# Patient Record
Sex: FEMALE | Race: WHITE | URBAN - METROPOLITAN AREA
[De-identification: names, ages, dates, MRNs, and addresses within clinical notes are randomized per-mention and may not be internally consistent; named-entity substitution may affect disease eponyms.]

---

## 2017-02-21 ENCOUNTER — OP HISTORICAL/CONVERTED ENCOUNTER (OUTPATIENT)
Dept: OTHER | Age: 37
End: 2017-02-21

## 2017-06-09 ENCOUNTER — OFFICE VISIT (OUTPATIENT)
Dept: ENDOCRINOLOGY | Age: 37
End: 2017-06-09

## 2017-06-09 VITALS
DIASTOLIC BLOOD PRESSURE: 60 MMHG | HEART RATE: 101 BPM | RESPIRATION RATE: 16 BRPM | SYSTOLIC BLOOD PRESSURE: 132 MMHG | OXYGEN SATURATION: 97 % | HEIGHT: 68 IN | BODY MASS INDEX: 44.41 KG/M2 | WEIGHT: 293 LBS | TEMPERATURE: 97.6 F

## 2017-06-09 DIAGNOSIS — L68.0 HIRSUTISM: ICD-10-CM

## 2017-06-09 DIAGNOSIS — N91.5 OLIGOMENORRHEA: ICD-10-CM

## 2017-06-09 DIAGNOSIS — E28.2 PCOS (POLYCYSTIC OVARIAN SYNDROME): ICD-10-CM

## 2017-06-09 DIAGNOSIS — E66.01 MORBID OBESITY, UNSPECIFIED OBESITY TYPE (HCC): ICD-10-CM

## 2017-06-09 DIAGNOSIS — E28.2 PCOS (POLYCYSTIC OVARIAN SYNDROME): Primary | ICD-10-CM

## 2017-06-09 RX ORDER — METFORMIN HYDROCHLORIDE 500 MG/1
500 TABLET, EXTENDED RELEASE ORAL 2 TIMES DAILY WITH MEALS
Qty: 180 TAB | Refills: 3 | Status: SHIPPED | OUTPATIENT
Start: 2017-06-09 | End: 2017-07-13 | Stop reason: SDUPTHER

## 2017-06-09 RX ORDER — NORETHINDRONE ACETATE AND ETHINYL ESTRADIOL 1MG-20(21)
1 KIT ORAL DAILY
Qty: 90 TAB | Refills: 3 | Status: SHIPPED | OUTPATIENT
Start: 2017-06-09 | End: 2017-12-15 | Stop reason: ALTCHOICE

## 2017-06-09 RX ORDER — SPIRONOLACTONE 100 MG/1
100 TABLET, FILM COATED ORAL
Qty: 90 TAB | Refills: 3 | Status: SHIPPED | OUTPATIENT
Start: 2017-06-09 | End: 2018-05-25 | Stop reason: SDUPTHER

## 2017-06-09 NOTE — MR AVS SNAPSHOT
Visit Information Date & Time Provider Department Dept. Phone Encounter #  
 6/9/2017  1:45 PM Donnell Tao MD [facility] [phone] [id number] Follow-up Instructions Return in about 6 months (around 12/9/2017). Upcoming Health Maintenance Date Due DTaP/Tdap/Td series (1 - Tdap) 8/13/2000 PAP AKA CERVICAL CYTOLOGY 8/13/2000 INFLUENZA AGE 9 TO ADULT 8/1/2017 Allergies as of 6/9/2017  Review Complete On: 6/9/2017 By: Donnell Tao MD  
  
 Severity Noted Reaction Type Reactions Penicillins  03/13/2015    Other (comments) Current Immunizations  Never Reviewed No immunizations on file. Not reviewed this visit You Were Diagnosed With   
  
 Codes Comments PCOS (polycystic ovarian syndrome)    -  Primary ICD-10-CM: E28.2 ICD-9-CM: 256.4 Oligomenorrhea     ICD-10-CM: N91.5 ICD-9-CM: 626.1 Hirsutism     ICD-10-CM: L68.0 ICD-9-CM: 704.1 Vitals BP Pulse Temp Resp Height(growth percentile) Weight(growth percentile) 132/60 (BP 1 Location: Right arm, BP Patient Position: Sitting) (!) 101 97.6 °F (36.4 °C) (Oral) 16 5' 8\" (1.727 m) 304 lb 14.4 oz (138.3 kg) SpO2 BMI Smoking Status 97% 46.36 kg/m2 Never Smoker BMI and BSA Data Body Mass Index Body Surface Area  
 46.36 kg/m 2 2.58 m 2 Preferred Pharmacy Pharmacy Name Phone  [address] [phone] Your Updated Medication List  
  
   
This list is accurate as of: 6/9/17  2:50 PM.  Always use your most recent med list.  
  
  
  
  
 amitriptyline 25 mg tablet Commonly known as:  ELAVIL Take 25 mg by mouth nightly. EPIPEN 0.3 mg/0.3 mL injection Generic drug:  EPINEPHrine  
0.3 mg by IntraMUSCular route as needed. FIORINAL capsule Generic drug:  butalbital-aspirin-caffeine Take 1 Cap by mouth as needed for Pain. FLEXERIL 10 mg tablet Generic drug:  cyclobenzaprine Take 10 mg by mouth as needed for Muscle Spasm(s). FLONASE 50 mcg/actuation nasal spray Generic drug:  fluticasone 2 Sprays by Both Nostrils route as needed for Rhinitis. meclizine 25 mg tablet Commonly known as:  ANTIVERT Take 25 mg by mouth as needed. vertigo  
  
 norethindrone-ethinyl estradiol 1 mg-20 mcg (21)/75 mg (7) Tab Commonly known as:  Clearance Speedy FE 1/20 Take 1 Tab by mouth daily. 1 tab daily  
  
 scopolamine 1.5 mg (1 mg over 3 days) Pt3d Commonly known as:  TRANSDERM-SCOP  
1 Patch by TransDERmal route as needed. spironolactone 100 mg tablet Commonly known as:  ALDACTONE Take 1 Tab by mouth nightly. Follow-up Instructions Return in about 6 months (around 12/9/2017). Patient Instructions Instructions for salivary cortisol test 
 
1. Do not brush teeth before collecting specimen. 2. Do not eat or drink for 15 minutes prior to specimen collection. Night 1- Swab inside of cheeks between 11p-midnight for four (4) minutes, label with name, date of birth, collection date and collection time. Place swab in the freezer. 
  
Night 5- Swab inside of cheeks between 11p-midnight for four (4) minutes, label with name, date of birth, collection date and collection time. Place swab in the freezer.  
  
Swabs are to remain frozen until you can drop them off to [facility] with orders or our lab in the office. Introducing [facility]! Xiomara Yousif introduces [employer] patient portal. Now you can access parts of your medical record, email your doctor's office, and request medication refills online. 1. In your internet browser, go to [email or web address] 2. Click on the First Time User? Click Here link in the Sign In box. You will see the New Member Sign Up page. 3. Enter your [employer] Access Code exactly as it appears below.  You will not need to use this code after youve completed the sign-up process. If you do not sign up before the expiration date, you must request a new code. · [employer] Access Code: [email or web address] Expires: 9/7/2017  2:50 PM 
 
4. Enter the last four digits of your Social Security Number (xxxx) and Date of Birth (mm/dd/yyyy) as indicated and click Submit. You will be taken to the next sign-up page. 5. Create a [employer] ID. This will be your [employer] login ID and cannot be changed, so think of one that is secure and easy to remember. 6. Create a [employer] password. You can change your password at any time. 7. Enter your Password Reset Question and Answer. This can be used at a later time if you forget your password. 8. Enter your e-mail address. You will receive e-mail notification when new information is available in [address]. 9. Click Sign Up. You can now view and download portions of your medical record. 10. Click the Download Summary menu link to download a portable copy of your medical information. If you have questions, please visit the Frequently Asked Questions section of the [employer] website. Remember, [employer] is NOT to be used for urgent needs. For medical emergencies, dial 911. Now available from your iPhone and Android! Please provide this summary of care documentation to your next provider. Your primary care clinician is listed as Nik Schmidt. If you have any questions after today's visit, please call [phone].

## 2017-06-09 NOTE — PROGRESS NOTES
Jess Chao MD                Patient Information  Date:6/9/2017  Name : Ronald Rodarte 39 y.o.     YOB: 1980         Referred by: Self referred      Chief Complaint   Patient presents with    PCOS    Hirsutism       History of Present Illness: Ronald Rodarte is a 39 y.o. female here for follow up     Surgery was April 2017  Could not tolerate metformin, seen 2 years ago   Was on qsymia  She is on oral contraceptives     Hx CSF leak    More facial redness     She was tried on Metformin Immediate Release as well as Extended Release which she did not tolerate due to severe GI side effects. She was diagnosed with PCOS several years ago, managed by Dr. Maggie Vásquez, endocrinologist.    She reports oligomenorrhea and has three cycles per year. menarche at age 14 years. Wt Readings from Last 3 Encounters:   06/09/17 304 lb 14.4 oz (138.3 kg)   04/03/15 328 lb (148.8 kg)   03/13/15 328 lb 12.8 oz (149.1 kg)         Past Medical History:   Diagnosis Date    Hirsutism 3/13/2015    Migraine     Obesity 3/13/2015    PCOS (polycystic ovarian syndrome)      Past Surgical History:   Procedure Laterality Date    HX OTHER SURGICAL      DNC    HX OTHER SURGICAL  04/18/2017    brain surgery for ceffleak    HX TONSILLECTOMY       Current Outpatient Prescriptions   Medication Sig    cyclobenzaprine (FLEXERIL) 10 mg tablet Take 10 mg by mouth as needed for Muscle Spasm(s).  meclizine (ANTIVERT) 25 mg tablet Take 25 mg by mouth as needed. vertigo    scopolamine (TRANSDERM-SCOP) 1.5 mg 1 Patch by TransDERmal route as needed.  amitriptyline (ELAVIL) 25 mg tablet Take 25 mg by mouth nightly.  EPINEPHrine (EPIPEN) 0.3 mg/0.3 mL (1:1,000) injection 0.3 mg by IntraMUSCular route as needed.  fluticasone (FLONASE) 50 mcg/actuation nasal spray 2 Sprays by Both Nostrils route as needed for Rhinitis.     butalbital-aspirin-caffeine [facility]) capsule Take 1 Cap by mouth as needed for Pain.  metFORMIN ER (GLUCOPHAGE XR) 500 mg tablet Take 1 Tab by mouth two (2) times daily (with meals).  norethindrone-ethinyl estradiol (JUNEL FE 1/20) 1 mg-20 mcg (21)/75 mg (7) tab Take 1 Tab by mouth daily. 1 tab daily    spironolactone (ALDACTONE) 100 mg tablet Take 1 Tab by mouth nightly. No current facility-administered medications for this visit. Allergies   Allergen Reactions    Penicillins Other (comments)         Review of Systems:  - Constitutional Symptoms: no fevers, chills  - Eyes: no blurry vision no double vision  - Cardiovascular: no chest pain no palpitations  - Respiratory: no cough no shortness of breath  - Gastrointestinal: no dysphagia no abdominal pain  - Musculoskeletal: no joint pains + weakness  - Integumentary: no rashes  - Neurological: no numbness, tingling,  headaches  -     Physical Examination:  - Blood pressure 132/60, pulse (!) 101, temperature 97.6 °F (36.4 °C), temperature source Oral, resp. rate 16, height 5' 8\" (1.727 m), weight 304 lb 14.4 oz (138.3 kg), SpO2 97 %. Body mass index is 46.36 kg/(m^2). - General: pleasant, no distress, good eye contact, obese  - HEENT: no exopthalmos, no periorbital edema, no scleral/conjunctival injection, EOMI, Coarse thick hair chin and upper lip  - Neck: supple, no thyromegaly,   Facial erythema up  - Cardiovascular: regular, normal rate, normal S1 and S2  - Respiratory: clear to auscultation bilaterally  - Gastrointestinal: soft, nontender, nondistended,BS +  - Musculoskeletal:no acanthosis nigricans, no abnormal abdominal striae, no rashes, no edema  - Neurological: Alert and oriented, no tremor  - Psychiatric: normal mood and affect        Assessment/Plan:     1. PCOS (polycystic ovarian syndrome)    2. Oligomenorrhea    3. Hirsutism    4. Morbid obesity, unspecified obesity type        1. PCOS. Discussed  importance of hormonal regulation and also metabolic consequences. Willing to try Metformin again Discussed carbohydrate portion control, increase activity and weight loss. 2.  Hirsutism. Oral contraceptives in combination with Spironolactone. She was  also advised against pregnancy while she is on spironolactone because of teratogenic effects and hence needs to be taken with OCPS. Laser therapy and other epilation methods also helps. 3.  Oligomenorrhea. Metformin as discussed. 4.  Workup for Cushing's, congenital adrenal hyperplasia, non-classical - negative in the past    Salivary cortisol ,       Letha Humphries MD      Patient Instructions   Instructions for salivary cortisol test    1. Do not brush teeth before collecting specimen. 2. Do not eat or drink for 15 minutes prior to specimen collection. Night 1- Swab inside of cheeks between 11p-midnight for four (4) minutes, label with name, date of birth, collection date and collection time. Place swab in the freezer.     Night 5- Swab inside of cheeks between 11p-midnight for four (4) minutes, label with name, date of birth, collection date and collection time. Place swab in the freezer.      Swabs are to remain frozen until you can drop them off to [facility] with orders or our lab in the office. Follow-up Disposition:  Return in about 6 months (around 12/9/2017).

## 2017-06-09 NOTE — PROGRESS NOTES
Linnea Gallegos is a 39 y.o. female here for   Chief Complaint   Patient presents with    PCOS    Hirsutism       1. Have you been to the ER, urgent care clinic since your last visit? Hospitalized since your last visit? - [facility] April 2017 for brain surgery     2. Have you seen or consulted any other health care providers outside of the [address] [place] [address] since your last visit?   Include any pap smears or colon screening.- Dr. Olimpia Angulo, PCP    Wt Readings from Last 3 Encounters:   04/03/15 328 lb (148.8 kg)   03/13/15 328 lb 12.8 oz (149.1 kg)     Temp Readings from Last 3 Encounters:   04/03/15 97.2 °F (36.2 °C) (Oral)   03/13/15 95.9 °F (35.5 °C)     BP Readings from Last 3 Encounters:   04/03/15 137/68   03/13/15 136/75     Pulse Readings from Last 3 Encounters:   04/03/15 (!) 103   03/13/15 95

## 2017-06-21 DIAGNOSIS — E28.2 PCOS (POLYCYSTIC OVARIAN SYNDROME): ICD-10-CM

## 2017-06-21 DIAGNOSIS — N91.5 OLIGOMENORRHEA: ICD-10-CM

## 2017-06-21 DIAGNOSIS — L68.0 HIRSUTISM: ICD-10-CM

## 2017-06-22 LAB
ALBUMIN SERPL-MCNC: 4.3 G/DL (ref 3.5–5.5)
ALBUMIN/GLOB SERPL: 1.6 {RATIO} (ref 1.2–2.2)
ALP SERPL-CCNC: 51 IU/L (ref 39–117)
ALT SERPL-CCNC: 40 IU/L (ref 0–32)
AST SERPL-CCNC: 24 IU/L (ref 0–40)
BILIRUB SERPL-MCNC: 0.6 MG/DL (ref 0–1.2)
BUN SERPL-MCNC: 14 MG/DL (ref 6–20)
BUN/CREAT SERPL: 19 (ref 9–23)
CALCIUM SERPL-MCNC: 9.9 MG/DL (ref 8.7–10.2)
CHLORIDE SERPL-SCNC: 101 MMOL/L (ref 96–106)
CO2 SERPL-SCNC: 24 MMOL/L (ref 18–29)
CREAT SERPL-MCNC: 0.72 MG/DL (ref 0.57–1)
GLOBULIN SER CALC-MCNC: 2.7 G/DL (ref 1.5–4.5)
GLUCOSE SERPL-MCNC: 87 MG/DL (ref 65–99)
POTASSIUM SERPL-SCNC: 4.5 MMOL/L (ref 3.5–5.2)
PROT SERPL-MCNC: 7 G/DL (ref 6–8.5)
SODIUM SERPL-SCNC: 139 MMOL/L (ref 134–144)
TSH SERPL DL<=0.005 MIU/L-ACNC: 1.53 UIU/ML (ref 0.45–4.5)

## 2017-06-24 LAB
CORTIS BS SAL-MCNC: 0.02 UG/DL
CORTIS F 24H UR-MRATE: 11 UG/24 HR (ref 0–50)
CORTIS F UR-MCNC: 7 UG/L
CORTIS SP1 P CHAL SAL-MCNC: 0.01 UG/DL
CREAT 24H UR-MRATE: 1226 MG/24 HR (ref 800–1800)
CREAT UR-MCNC: 76.6 MG/DL

## 2017-06-26 ENCOUNTER — TELEPHONE (OUTPATIENT)
Dept: ENDOCRINOLOGY | Age: 37
End: 2017-06-26

## 2017-06-26 NOTE — TELEPHONE ENCOUNTER
----- Message from Tamiko Troncoso MD sent at 6/24/2017  9:22 AM EDT -----  Tests normal so far     No urine test result available

## 2017-06-26 NOTE — TELEPHONE ENCOUNTER
Per Dr. Jake Roldan, informed pt of result note, as noted above. Pt verbalized understanding with no further questions or concerns at this time.

## 2017-06-27 ENCOUNTER — TELEPHONE (OUTPATIENT)
Dept: ENDOCRINOLOGY | Age: 37
End: 2017-06-27

## 2017-06-27 NOTE — TELEPHONE ENCOUNTER
Per Dr. Rebeca Lopez, informed pt of result note, as noted above. Pt verbalized understanding with no further questions or concerns at this time.

## 2017-06-27 NOTE — TELEPHONE ENCOUNTER
Attempted to call. Unsuccessful. Left msg for Shama Horner to give us a call back at the office. A callback number was left.

## 2017-06-27 NOTE — TELEPHONE ENCOUNTER
----- Message from Rodney Richmond MD sent at 6/26/2017 10:25 PM EDT -----  No other hormonal abnormality found    She has PCOS and has to continue med which was RX to her

## 2017-07-12 DIAGNOSIS — N91.5 OLIGOMENORRHEA: ICD-10-CM

## 2017-07-12 DIAGNOSIS — E28.2 PCOS (POLYCYSTIC OVARIAN SYNDROME): ICD-10-CM

## 2017-07-12 DIAGNOSIS — L68.0 HIRSUTISM: ICD-10-CM

## 2017-07-12 NOTE — TELEPHONE ENCOUNTER
Pt called stating that [facility] sent a letter stating as of August 1st she will need a PA for her Metformin RX. She stated you can call [phone] to get this this taken care of.

## 2017-07-12 NOTE — TELEPHONE ENCOUNTER
Metformin XR is a generic medicine and not sure why [facility] wants PA    Any extended release Metformin is fine ,please call the number

## 2017-07-13 RX ORDER — METFORMIN HYDROCHLORIDE 500 MG/1
500 TABLET, EXTENDED RELEASE ORAL 2 TIMES DAILY WITH MEALS
Qty: 180 TAB | Refills: 3 | Status: SHIPPED | OUTPATIENT
Start: 2017-07-13 | End: 2018-09-21 | Stop reason: ALTCHOICE

## 2017-09-20 RX ORDER — NORETHINDRONE ACETATE AND ETHINYL ESTRADIOL AND FERROUS FUMARATE 1MG-20(21)
KIT ORAL
Qty: 84 TAB | Refills: 3 | Status: SHIPPED | OUTPATIENT
Start: 2017-09-20 | End: 2018-11-03 | Stop reason: SDUPTHER

## 2017-11-28 ENCOUNTER — TELEPHONE (OUTPATIENT)
Dept: ENDOCRINOLOGY | Age: 37
End: 2017-11-28

## 2017-11-28 DIAGNOSIS — N91.2 AMENORRHEA: Primary | ICD-10-CM

## 2017-12-15 ENCOUNTER — OFFICE VISIT (OUTPATIENT)
Dept: ENDOCRINOLOGY | Age: 37
End: 2017-12-15

## 2017-12-15 VITALS
WEIGHT: 293 LBS | BODY MASS INDEX: 44.41 KG/M2 | TEMPERATURE: 96.5 F | SYSTOLIC BLOOD PRESSURE: 141 MMHG | OXYGEN SATURATION: 97 % | DIASTOLIC BLOOD PRESSURE: 58 MMHG | RESPIRATION RATE: 14 BRPM | HEIGHT: 68 IN | HEART RATE: 101 BPM

## 2017-12-15 DIAGNOSIS — R73.02 GLUCOSE INTOLERANCE (IMPAIRED GLUCOSE TOLERANCE): ICD-10-CM

## 2017-12-15 DIAGNOSIS — E66.01 MORBID OBESITY (HCC): ICD-10-CM

## 2017-12-15 DIAGNOSIS — L68.0 HIRSUTISM: ICD-10-CM

## 2017-12-15 DIAGNOSIS — E28.2 PCOS (POLYCYSTIC OVARIAN SYNDROME): Primary | ICD-10-CM

## 2017-12-15 LAB — HBA1C MFR BLD HPLC: 5.1 %

## 2017-12-15 NOTE — PROGRESS NOTES
Paz Ochoa MD                Patient Information  Date:12/16/2017  Name : Brigido Cortez 44 y.o.     YOB: 1980         Referred by: Self referred      Chief Complaint   Patient presents with    PCOS       History of Present Illness: Brigido Cortez is a 44 y.o. female here for follow up     Surgery was April 2017  Could not tolerate metformin,   Was on qsymia, had kidney stone and hence was discontinued  Since stopping the medication she has gained weight. She has also stopped exercise. She is on oral contraceptives     Hx CSF leak    More facial redness, was diagnosed with rosacea and is on antibiotics      She was diagnosed with PCOS several years ago, managed by Dr. Marcelo Miller, endocrinologist.   menarche at age 14 years. Wt Readings from Last 3 Encounters:   12/15/17 299 lb 12.8 oz (136 kg)   06/09/17 304 lb 14.4 oz (138.3 kg)   04/03/15 328 lb (148.8 kg)     BP Readings from Last 3 Encounters:   12/15/17 141/58   06/09/17 132/60   04/03/15 137/68       Past Medical History:   Diagnosis Date    Hirsutism 3/13/2015    Migraine     Obesity 3/13/2015    PCOS (polycystic ovarian syndrome)      Past Surgical History:   Procedure Laterality Date    HX OTHER SURGICAL      DNC    HX OTHER SURGICAL  04/18/2017    brain surgery for ceffleak    HX TONSILLECTOMY       Current Outpatient Prescriptions   Medication Sig    naltrexone-buPROPion (CONTRAVE) 8-90 mg TbER ER tablet Take 2 Tabs by mouth two (2) times a day.  JUNEL FE 1/20, 28, 1 mg-20 mcg (21)/75 mg (7) tab TAKE 1 TABLET DAILY    metFORMIN ER (GLUCOPHAGE XR) 500 mg tablet Take 1 Tab by mouth two (2) times daily (with meals). (Patient taking differently: Take 1,000 mg by mouth daily (with dinner). )    spironolactone (ALDACTONE) 100 mg tablet Take 1 Tab by mouth nightly.  cyclobenzaprine (FLEXERIL) 10 mg tablet Take 10 mg by mouth as needed for Muscle Spasm(s).     meclizine (ANTIVERT) 25 mg tablet Take 25 mg by mouth as needed. vertigo    scopolamine (TRANSDERM-SCOP) 1.5 mg 1 Patch by TransDERmal route as needed.  amitriptyline (ELAVIL) 25 mg tablet Take 50-75 mg by mouth nightly.  EPINEPHrine (EPIPEN) 0.3 mg/0.3 mL (1:1,000) injection 0.3 mg by IntraMUSCular route as needed.  fluticasone (FLONASE) 50 mcg/actuation nasal spray 2 Sprays by Both Nostrils route as needed for Rhinitis.  butalbital-aspirin-caffeine (FIORINAL) capsule Take 1 Cap by mouth as needed for Pain. No current facility-administered medications for this visit. Allergies   Allergen Reactions    Penicillins Other (comments)         Review of Systems:  - Constitutional Symptoms: no fevers, chills  - Eyes: no blurry vision no double vision  - Cardiovascular: no chest pain no palpitations  - Respiratory: no cough no shortness of breath  - Gastrointestinal: no dysphagia no abdominal pain  - Musculoskeletal: no joint pains + weakness  - Integumentary: no rashes  - Neurological: no numbness, tingling,  headaches  -     Physical Examination:  - Blood pressure 141/58, pulse (!) 101, temperature 96.5 °F (35.8 °C), temperature source Oral, resp. rate 14, height 5' 8\" (1.727 m), weight 299 lb 12.8 oz (136 kg), SpO2 97 %. Body mass index is 45.58 kg/(m^2). - General: pleasant, no distress, good eye contact, obese  - HEENT: no exopthalmos, no periorbital edema, no scleral/conjunctival injection, EOMI, Coarse thick hair chin and upper lip  - Neck: supple, no thyromegaly,   Facial erythema  - Cardiovascular: regular, normal rate, normal S1 and S2  - Respiratory: clear to auscultation bilaterally  - Gastrointestinal: soft, nontender, nondistended,BS +  - Musculoskeletal:, no rashes, no edema  - Neurological: Alert and oriented, no tremor  - Psychiatric: normal mood and affect        Assessment/Plan:     1. PCOS (polycystic ovarian syndrome)    2. Hirsutism    3. Morbid obesity ([address]    4.  Glucose intolerance (impaired glucose tolerance)        1. PCOS. on Metformin again Discussed carbohydrate portion control, increase activity and weight loss. On oral contraceptives    2. Hirsutism. Oral contraceptives in combination with Spironolactone. She was  also advised against pregnancy while she is on spironolactone because of teratogenic effects and hence needs to be taken with OCPS. Laser therapy and other epilation methods also helps. 3.  Oligomenorrhea. Metformin     4. Workup for Cushing's, congenital adrenal hyperplasia, non-classical - negative in the past.  Low-dose late-night dexamethasone suppression test as well as salivary cortisol which were both normal.  Wants to try Contrave, discussed the side effects. She will wean off amitriptyline          Annalee Molina MD      There are no Patient Instructions on file for this visit. Follow-up Disposition:  Return in about 3 months (around 3/15/2018).

## 2017-12-15 NOTE — MR AVS SNAPSHOT
Visit Information Date & Time Provider Department Dept. Phone Encounter #  
 12/15/2017  4:00 PM Ponhco Naqvi MD [facility] [phone] [id number] Follow-up Instructions Return in about 3 months (around 3/15/2018). Upcoming Health Maintenance Date Due DTaP/Tdap/Td series (1 - Tdap) 8/13/2000 PAP AKA CERVICAL CYTOLOGY 8/13/2000 Influenza Age 5 to Adult 8/1/2017 Allergies as of 12/15/2017  Review Complete On: 12/15/2017 By: Poncho Naqvi MD  
  
 Severity Noted Reaction Type Reactions Penicillins  03/13/2015    Other (comments) Current Immunizations  Never Reviewed No immunizations on file. Not reviewed this visit You Were Diagnosed With   
  
 Codes Comments PCOS (polycystic ovarian syndrome)    -  Primary ICD-10-CM: E28.2 ICD-9-CM: 256.4 Hirsutism     ICD-10-CM: L68.0 ICD-9-CM: 704.1 Morbid obesity ([address]     ICD-10-CM: E66.01 
ICD-9-CM: 278.01 Vitals BP Pulse Temp Resp Height(growth percentile) Weight(growth percentile) 141/58 (BP 1 Location: Left arm, BP Patient Position: Sitting) (!) 101 96.5 °F (35.8 °C) (Oral) 14 5' 8\" (1.727 m) 299 lb 12.8 oz (136 kg) SpO2 BMI Smoking Status 97% 45.58 kg/m2 Never Smoker Vitals History BMI and BSA Data Body Mass Index Body Surface Area 45.58 kg/m 2 2.55 m 2 Preferred Pharmacy Pharmacy Name Phone  [address] [phone] Your Updated Medication List  
  
   
This list is accurate as of: 12/15/17  4:31 PM.  Always use your most recent med list.  
  
  
  
  
 amitriptyline 25 mg tablet Commonly known as:  ELAVIL Take 50-75 mg by mouth nightly. EPIPEN 0.3 mg/0.3 mL injection Generic drug:  EPINEPHrine  
0.3 mg by IntraMUSCular route as needed. FIORINAL capsule Generic drug:  butalbital-aspirin-caffeine Take 1 Cap by mouth as needed for Pain. FLEXERIL 10 mg tablet Generic drug:  cyclobenzaprine Take 10 mg by mouth as needed for Muscle Spasm(s). FLONASE 50 mcg/actuation nasal spray Generic drug:  fluticasone 2 Sprays by Both Nostrils route as needed for Rhinitis. JUNEL FE 1/20 (28) 1 mg-20 mcg (21)/75 mg (7) Tab Generic drug:  norethindrone-ethinyl estradiol TAKE 1 TABLET DAILY  
  
 meclizine 25 mg tablet Commonly known as:  ANTIVERT Take 25 mg by mouth as needed. vertigo  
  
 metFORMIN  mg tablet Commonly known as:  GLUCOPHAGE XR Take 1 Tab by mouth two (2) times daily (with meals). naltrexone-buPROPion 8-90 mg Tber ER tablet Commonly known as:  Susie Peasant Take 2 Tabs by mouth two (2) times a day.  
  
 scopolamine 1 mg over 3 days Pt3d Commonly known as:  TRANSDERM-SCOP  
1 Patch by TransDERmal route as needed. spironolactone 100 mg tablet Commonly known as:  ALDACTONE Take 1 Tab by mouth nightly. Prescriptions Printed Refills  
 naltrexone-buPROPion (CONTRAVE) 8-90 mg TbER ER tablet 3 Sig: Take 2 Tabs by mouth two (2) times a day. Class: Print Route: Oral  
  
We Performed the Following AMB POC HEMOGLOBIN A1C [08227 CPT(R)] Follow-up Instructions Return in about 3 months (around 3/15/2018). Introducing [facility]! Dear Stefani Shaw: 
Thank you for requesting a [employer] account. Our records indicate that you already have an active [employer] account. You can access your account anytime at [email or web address] Did you know that you can access your hospital and ER discharge instructions at any time in [employer]? You can also review all of your test results from your hospital stay or ER visit. Additional Information If you have questions, please visit the Frequently Asked Questions section of the [employer] website at [email or web address] Remember, [employer] is NOT to be used for urgent needs.  For medical emergencies, dial 911. Now available from your iPhone and Android! Please provide this summary of care documentation to your next provider. Your primary care clinician is listed as Dario Mcneil. If you have any questions after today's visit, please call [phone].

## 2017-12-15 NOTE — PROGRESS NOTES
Andrew Estevez is a 44 y.o. female here for   Chief Complaint   Patient presents with    PCOS       1. Have you been to the ER, urgent care clinic since your last visit? Hospitalized since your last visit? -[facility] in July 2017 to have kidney stones removed    2. Have you seen or consulted any other health care providers outside of the [address] [place] [address] since your last visit? Include any pap smears or colon screening. -PCP    Wt Readings from Last 3 Encounters:   06/09/17 304 lb 14.4 oz (138.3 kg)   04/03/15 328 lb (148.8 kg)   03/13/15 328 lb 12.8 oz (149.1 kg)     Temp Readings from Last 3 Encounters:   06/09/17 97.6 °F (36.4 °C) (Oral)   04/03/15 97.2 °F (36.2 °C) (Oral)   03/13/15 95.9 °F (35.5 °C)     BP Readings from Last 3 Encounters:   06/09/17 132/60   04/03/15 137/68   03/13/15 136/75     Pulse Readings from Last 3 Encounters:   06/09/17 (!) 101   04/03/15 (!) 103   03/13/15 95

## 2017-12-18 ENCOUNTER — TELEPHONE (OUTPATIENT)
Dept: ENDOCRINOLOGY | Age: 37
End: 2017-12-18

## 2018-03-16 ENCOUNTER — OFFICE VISIT (OUTPATIENT)
Dept: ENDOCRINOLOGY | Age: 38
End: 2018-03-16

## 2018-03-16 VITALS
RESPIRATION RATE: 14 BRPM | WEIGHT: 293 LBS | TEMPERATURE: 95.3 F | BODY MASS INDEX: 44.41 KG/M2 | HEART RATE: 115 BPM | SYSTOLIC BLOOD PRESSURE: 131 MMHG | DIASTOLIC BLOOD PRESSURE: 54 MMHG | OXYGEN SATURATION: 97 % | HEIGHT: 68 IN

## 2018-03-16 DIAGNOSIS — L68.0 HIRSUTISM: ICD-10-CM

## 2018-03-16 DIAGNOSIS — R73.02 GLUCOSE INTOLERANCE (IMPAIRED GLUCOSE TOLERANCE): ICD-10-CM

## 2018-03-16 DIAGNOSIS — E28.2 PCOS (POLYCYSTIC OVARIAN SYNDROME): Primary | ICD-10-CM

## 2018-03-16 DIAGNOSIS — E66.01 MORBID OBESITY (HCC): ICD-10-CM

## 2018-03-16 NOTE — PROGRESS NOTES
David Sands is a 44 y.o. female here for   Chief Complaint   Patient presents with    PCOS       1. Have you been to the ER, urgent care clinic since your last visit? Hospitalized since your last visit? -no    2. Have you seen or consulted any other health care providers outside of the [address] [place] [address] since your last visit?   Include any pap smears or colon screening.-no    Wt Readings from Last 3 Encounters:   12/15/17 299 lb 12.8 oz (136 kg)   06/09/17 304 lb 14.4 oz (138.3 kg)   04/03/15 328 lb (148.8 kg)     Temp Readings from Last 3 Encounters:   12/15/17 96.5 °F (35.8 °C) (Oral)   06/09/17 97.6 °F (36.4 °C) (Oral)   04/03/15 97.2 °F (36.2 °C) (Oral)     BP Readings from Last 3 Encounters:   12/15/17 141/58   06/09/17 132/60   04/03/15 137/68     Pulse Readings from Last 3 Encounters:   12/15/17 (!) 101   06/09/17 (!) 101   04/03/15 (!) 103

## 2018-03-16 NOTE — PROGRESS NOTES
Sunil Pope MD                Patient Information  Date:3/16/2018  Name : Rossi Daniel 44 y.o.     YOB: 1980         Referred by: Self referred      Chief Complaint   Patient presents with    PCOS       History of Present Illness: Rossi Daniel is a 44 y.o. female here for follow up     She is tolerating metformin now  She tried contrave, discontinued due to diarrhea  No upset stomach  No nausea or vomiting. Not able to lose weight. Was on qsymia, had kidney stone and hence was discontinued    She is on oral contraceptives     Hx CSF leak      She was diagnosed with PCOS several years ago, managed by Dr. Patti Ziegler, endocrinologist.   menarche at age 14 years. Wt Readings from Last 3 Encounters:   03/16/18 300 lb (136.1 kg)   12/15/17 299 lb 12.8 oz (136 kg)   06/09/17 304 lb 14.4 oz (138.3 kg)     BP Readings from Last 3 Encounters:   03/16/18 131/54   12/15/17 141/58   06/09/17 132/60       Past Medical History:   Diagnosis Date    Hirsutism 3/13/2015    Migraine     Obesity 3/13/2015    PCOS (polycystic ovarian syndrome)      Past Surgical History:   Procedure Laterality Date    HX OTHER SURGICAL      DNC    HX OTHER SURGICAL  04/18/2017    brain surgery for ceffleak    HX TONSILLECTOMY       Current Outpatient Prescriptions   Medication Sig    JUNEL FE 1/20, 28, 1 mg-20 mcg (21)/75 mg (7) tab TAKE 1 TABLET DAILY    metFORMIN ER (GLUCOPHAGE XR) 500 mg tablet Take 1 Tab by mouth two (2) times daily (with meals). (Patient taking differently: Take 1,000 mg by mouth daily (with dinner). )    spironolactone (ALDACTONE) 100 mg tablet Take 1 Tab by mouth nightly.  cyclobenzaprine (FLEXERIL) 10 mg tablet Take 10 mg by mouth as needed for Muscle Spasm(s).  meclizine (ANTIVERT) 25 mg tablet Take 25 mg by mouth as needed. vertigo    scopolamine (TRANSDERM-SCOP) 1.5 mg 1 Patch by TransDERmal route as needed.     amitriptyline (ELAVIL) 25 mg tablet Take 50-75 mg by mouth nightly.  EPINEPHrine (EPIPEN) 0.3 mg/0.3 mL (1:1,000) injection 0.3 mg by IntraMUSCular route as needed.  fluticasone (FLONASE) 50 mcg/actuation nasal spray 2 Sprays by Both Nostrils route as needed for Rhinitis.  butalbital-aspirin-caffeine (FIORINAL) capsule Take 1 Cap by mouth as needed for Pain.  naltrexone-buPROPion (CONTRAVE) 8-90 mg TbER ER tablet Take 2 Tabs by mouth two (2) times a day. No current facility-administered medications for this visit. Allergies   Allergen Reactions    Penicillins Other (comments)         Review of Systems:  - Constitutional Symptoms: no fevers, chills  - Eyes: no blurry vision no double vision  - Cardiovascular: no chest pain no palpitations  - Respiratory: no cough no shortness of breath  - Gastrointestinal: no dysphagia no abdominal pain  - Musculoskeletal: no joint pains + weakness  - Integumentary: no rashes  - Neurological: no numbness, tingling,  headaches  -     Physical Examination:  - Blood pressure 131/54, pulse (!) 115, temperature 95.3 °F (35.2 °C), temperature source Oral, resp. rate 14, height 5' 8\" (1.727 m), weight 300 lb (136.1 kg), SpO2 97 %. Body mass index is 45.61 kg/(m^2). - General: pleasant, no distress, good eye contact, obese  - HEENT: no exopthalmos, no periorbital edema, no scleral/conjunctival injection, EOMI, Coarse thick hair chin and upper lip  - Neck: supple, no thyromegaly,   Facial erythema  - Cardiovascular: regular, normal rate, normal S1 and S2  - Respiratory: clear to auscultation bilaterally  - Gastrointestinal: soft, nontender, nondistended,BS +  - Musculoskeletal:, no rashes, no edema  - Neurological: Alert and oriented, no tremor  - Psychiatric: normal mood and affect        Assessment/Plan:     1. PCOS (polycystic ovarian syndrome)    2. Hirsutism        1. PCOS. on Metformin . Discussed carbohydrate portion control, increase activity .  On oral contraceptives    2. Hirsutism. Oral contraceptives in combination with Spironolactone. She was  also advised against pregnancy while she is on spironolactone because of teratogenic effects and hence needs to be taken with OCPS. Laser therapy and other epilation methods also helps. 3.  Oligomenorrhea. Metformin         4. Morbid obesity   workup for Cushing's, congenital adrenal hyperplasia, non-classical - negative in the past.  Low-dose late-night dexamethasone suppression test as well as salivary cortisol which were both normal.  Wants to retry Contrave, discussed the side effects. Hold metformin while she is on Contrave -maybe combination is causing more diarrhea, low carbohydrate diet  She will wean off amitriptyline    Tachycardia: TSH normal, no caffeine  Questionable amitriptyline  To discuss with PCP          Timmy Ellsworth MD      There are no Patient Instructions on file for this visit.   Follow-up Disposition: Not on File

## 2018-03-16 NOTE — MR AVS SNAPSHOT
[address] [facility] [address] 
[phone] Patient: Chasity Green MRN: [id number] OWS:[birth year] Visit Information Date & Time Provider Department Dept. Phone Encounter #  
 3/16/2018  3:45 PM Yanet Guadalupe MD [facility] [phone] [id number] Follow-up Instructions Return in about 6 months (around 9/16/2018). Upcoming Health Maintenance Date Due DTaP/Tdap/Td series (1 - Tdap) 8/13/2000 PAP AKA CERVICAL CYTOLOGY 8/13/2000 Influenza Age 5 to Adult 8/1/2017 Allergies as of 3/16/2018  Review Complete On: 3/16/2018 By: Yanet Guadalupe MD  
  
 Severity Noted Reaction Type Reactions Penicillins  03/13/2015    Other (comments) Current Immunizations  Never Reviewed No immunizations on file. Not reviewed this visit You Were Diagnosed With   
  
 Codes Comments PCOS (polycystic ovarian syndrome)    -  Primary ICD-10-CM: E28.2 ICD-9-CM: 256.4 Hirsutism     ICD-10-CM: L68.0 ICD-9-CM: 704.1 Vitals BP Pulse Temp Resp Height(growth percentile) Weight(growth percentile) 131/54 (BP 1 Location: Left arm, BP Patient Position: Sitting) (!) 115 95.3 °F (35.2 °C) (Oral) 14 5' 8\" (1.727 m) 300 lb (136.1 kg) SpO2 BMI Smoking Status 97% 45.61 kg/m2 Never Smoker Vitals History BMI and BSA Data Body Mass Index Body Surface Area  
 45.61 kg/m 2 2.56 m 2 Preferred Pharmacy Pharmacy Name Phone  [address] [facility] [address] [phone] Your Updated Medication List  
  
   
This list is accurate as of 3/16/18  4:28 PM.  Always use your most recent med list.  
  
  
  
  
 amitriptyline 25 mg tablet Commonly known as:  ELAVIL Take 50-75 mg by mouth nightly. EPIPEN 0.3 mg/0.3 mL injection Generic drug:  EPINEPHrine  
0.3 mg by IntraMUSCular route as needed. FIORINAL capsule Generic drug:  butalbital-aspirin-caffeine Take 1 Cap by mouth as needed for Pain. FLEXERIL 10 mg tablet Generic drug:  cyclobenzaprine Take 10 mg by mouth as needed for Muscle Spasm(s). FLONASE 50 mcg/actuation nasal spray Generic drug:  fluticasone 2 Sprays by Both Nostrils route as needed for Rhinitis. JUNEL FE 1/20 (28) 1 mg-20 mcg (21)/75 mg (7) Tab Generic drug:  norethindrone-ethinyl estradiol TAKE 1 TABLET DAILY  
  
 meclizine 25 mg tablet Commonly known as:  ANTIVERT Take 25 mg by mouth as needed. vertigo  
  
 metFORMIN  mg tablet Commonly known as:  GLUCOPHAGE XR Take 1 Tab by mouth two (2) times daily (with meals). * naltrexone-buPROPion 8-90 mg Tber ER tablet Commonly known as:  Demaris Saurabh Take 2 Tabs by mouth two (2) times a day. * naltrexone-buPROPion 8-90 mg Tber ER tablet Commonly known as:  Demaris Saurabh Week 1 1 tab PO QAM, Week 2 1QAM 1QHS, Week 3 2QAM 1 QHS, Week 4 & beyond 2QAM 2QHS  
  
 scopolamine 1 mg over 3 days Pt3d Commonly known as:  TRANSDERM-SCOP  
1 Patch by TransDERmal route as needed. spironolactone 100 mg tablet Commonly known as:  ALDACTONE Take 1 Tab by mouth nightly. * Notice: This list has 2 medication(s) that are the same as other medications prescribed for you. Read the directions carefully, and ask your doctor or other care provider to review them with you. Prescriptions Printed Refills  
 naltrexone-buPROPion (CONTRAVE) 8-90 mg TbER ER tablet 2 Sig: Week 1 1 tab PO QAM, Week 2 1QAM 1QHS, Week 3 2QAM 1 QHS, Week 4 & beyond 2QAM 2QHS Class: Print Follow-up Instructions Return in about 6 months (around 9/16/2018). Patient Instructions Gluten-Free Diet: Care Instructions Your Care Instructions To help your symptoms, your doctor has recommended a gluten-free diet. This means not eating foods that have gluten in them.  Gluten is a kind of protein. It's found in wheat, barley, and rye. If you eat a gluten-free diet, you can help manage your symptoms and prevent long-term problems. You can also get all the nutrition you need. Follow-up care is a key part of your treatment and safety. Be sure to make and go to all appointments, and call your doctor if you are having problems. It's also a good idea to know your test results and keep a list of the medicines you take. How can you care for yourself at home? · Don't eat any foods that have gluten in them. These include bagels, bread, crackers, and some cereals. They also include pasta and pizza. · Carefully read food labels. Look for wheat or wheat products in ice cream and candy. You may also find them in salad dressing, canned and frozen soups and vegetables, and other processed foods. · Avoid all beer products unless the label says they are gluten-free. Beers with and without alcohol have gluten unless the labels say they are gluten-free. This includes lagers, ales, and stouts. · Avoid oats, at least at first. Oats may cause symptoms in some people, perhaps as a result of contamination with wheat, barley, or rye during processing. But many people who have celiac disease can eat moderate amounts of oats without having symptoms. Health professionals vary in their long-term recommendations regarding eating foods with oats. But most agree it is safe to eat oats labeled as gluten-free. · When you eat out, look for restaurants that serve gluten-free food. You can also ask if the  is familiar with gluten-free cooking. · Try to learn more about gluten-free options. Find grocery stores that sell gluten-free pizza and other foods. If you have access to the Internet, look online for gluten-free foods and recipes. · On a gluten-free eating plan, it's okay to have: 
¨ Eggs and dairy products.  (But some dairy products may make your symptoms worse. Ask your doctor if you have questions about dairy products. Read ingredient labels carefully. Some processed cheeses contain gluten.) ¨ Flours and foods made with amaranth, arrowroot, beans, buckwheat, corn, cornmeal, flax, millet, potatoes, gluten-free nut and oat bran, quinoa, rice, sorghum, soybeans, tapioca, or teff. ¨ Fresh, frozen, or canned unprocessed meats. But avoid processed meats. Some examples of processed meats to avoid are hot dogs, salami, and deli meat. Read labels for additives that may contain gluten. ¨ Fresh, frozen, dried, or canned fruits and vegetables, if they do not have thickeners or other additives that contain gluten. ¨ Some alcohol drinks. These include wine, liqueurs, and ciders. They also include liquor like whiskey and breonna. When should you call for help? Watch closely for changes in your health, and be sure to contact your doctor if: 
? · You have unexplained weight loss. ? · You have diarrhea that lasts longer than 1 to 2 weeks. ? · You have unusual fatigue or mood changes, especially if these last more than a week and are not related to any other illness, such as the flu. ? · Your symptoms come back again. ? · Your stomach pain gets worse. Where can you learn more? Go to http://mallika-renee.info/. Enter 31 41 19 in the search box to learn more about \"Gluten-Free Diet: Care Instructions. \" Current as of: May 12, 2017 Content Version: 11.4 © 8608-6472 [employer]. Care instructions adapted under license by [employer] (which disclaims liability or warranty for this information). If you have questions about a medical condition or this instruction, always ask your healthcare professional. [address] any warranty or liability for your use of this information. Introducing [facility]! Dear Ioana Bowers: 
Thank you for requesting a Wattage account.   Our records indicate that you already have an active VONTRAVEL account. You can access your account anytime at [email or web address] Did you know that you can access your hospital and ER discharge instructions at any time in VONTRAVEL? You can also review all of your test results from your hospital stay or ER visit. Additional Information If you have questions, please visit the Frequently Asked Questions section of the VONTRAVEL website at [email or web address] Remember, VONTRAVEL is NOT to be used for urgent needs. For medical emergencies, dial 911. Now available from your iPhone and Android! Please provide this summary of care documentation to your next provider. Your primary care clinician is listed as Anola Lior. If you have any questions after today's visit, please call [phone].

## 2018-03-16 NOTE — PATIENT INSTRUCTIONS
Gluten-Free Diet: Care Instructions  Your Care Instructions    To help your symptoms, your doctor has recommended a gluten-free diet. This means not eating foods that have gluten in them. Gluten is a kind of protein. It's found in wheat, barley, and rye. If you eat a gluten-free diet, you can help manage your symptoms and prevent long-term problems. You can also get all the nutrition you need. Follow-up care is a key part of your treatment and safety. Be sure to make and go to all appointments, and call your doctor if you are having problems. It's also a good idea to know your test results and keep a list of the medicines you take. How can you care for yourself at home? · Don't eat any foods that have gluten in them. These include bagels, bread, crackers, and some cereals. They also include pasta and pizza. · Carefully read food labels. Look for wheat or wheat products in ice cream and candy. You may also find them in salad dressing, canned and frozen soups and vegetables, and other processed foods. · Avoid all beer products unless the label says they are gluten-free. Beers with and without alcohol have gluten unless the labels say they are gluten-free. This includes lagers, ales, and stouts. · Avoid oats, at least at first. Oats may cause symptoms in some people, perhaps as a result of contamination with wheat, barley, or rye during processing. But many people who have celiac disease can eat moderate amounts of oats without having symptoms. Health professionals vary in their long-term recommendations regarding eating foods with oats. But most agree it is safe to eat oats labeled as gluten-free. · When you eat out, look for restaurants that serve gluten-free food. You can also ask if the  is familiar with gluten-free cooking. · Try to learn more about gluten-free options. Find grocery stores that sell gluten-free pizza and other foods.  If you have access to the Internet, look online for gluten-free foods and recipes. · On a gluten-free eating plan, it's okay to have:  ¨ Eggs and dairy products. (But some dairy products may make your symptoms worse. Ask your doctor if you have questions about dairy products. Read ingredient labels carefully. Some processed cheeses contain gluten.)  ¨ Flours and foods made with amaranth, arrowroot, beans, buckwheat, corn, cornmeal, flax, millet, potatoes, gluten-free nut and oat bran, quinoa, rice, sorghum, soybeans, tapioca, or teff. ¨ Fresh, frozen, or canned unprocessed meats. But avoid processed meats. Some examples of processed meats to avoid are hot dogs, salami, and deli meat. Read labels for additives that may contain gluten. ¨ Fresh, frozen, dried, or canned fruits and vegetables, if they do not have thickeners or other additives that contain gluten. ¨ Some alcohol drinks. These include wine, liqueurs, and ciders. They also include liquor like whiskey and breonna. When should you call for help? Watch closely for changes in your health, and be sure to contact your doctor if:  ? · You have unexplained weight loss. ? · You have diarrhea that lasts longer than 1 to 2 weeks. ? · You have unusual fatigue or mood changes, especially if these last more than a week and are not related to any other illness, such as the flu. ? · Your symptoms come back again. ? · Your stomach pain gets worse. Where can you learn more? Go to http://mallika-renee.info/. Enter 31 41 19 in the search box to learn more about \"Gluten-Free Diet: Care Instructions. \"  Current as of: May 12, 2017  Content Version: 11.4  © 5039-4098 [employer]. Care instructions adapted under license by [employer] (which disclaims liability or warranty for this information).  If you have questions about a medical condition or this instruction, always ask your healthcare professional. [place] [address] any warranty or liability for your use of this information.

## 2018-04-27 DIAGNOSIS — E66.01 MORBID OBESITY (HCC): ICD-10-CM

## 2018-04-27 DIAGNOSIS — E28.2 PCOS (POLYCYSTIC OVARIAN SYNDROME): ICD-10-CM

## 2018-04-27 DIAGNOSIS — L68.0 HIRSUTISM: ICD-10-CM

## 2018-04-27 DIAGNOSIS — R73.02 GLUCOSE INTOLERANCE (IMPAIRED GLUCOSE TOLERANCE): ICD-10-CM

## 2018-07-15 ENCOUNTER — ED HISTORICAL/CONVERTED ENCOUNTER (OUTPATIENT)
Dept: OTHER | Age: 38
End: 2018-07-15

## 2018-09-17 DIAGNOSIS — E66.01 MORBID OBESITY (HCC): ICD-10-CM

## 2018-09-17 DIAGNOSIS — R73.02 GLUCOSE INTOLERANCE (IMPAIRED GLUCOSE TOLERANCE): ICD-10-CM

## 2018-09-17 DIAGNOSIS — L68.0 HIRSUTISM: ICD-10-CM

## 2018-09-17 DIAGNOSIS — E28.2 PCOS (POLYCYSTIC OVARIAN SYNDROME): ICD-10-CM

## 2018-10-05 ENCOUNTER — ED HISTORICAL/CONVERTED ENCOUNTER (OUTPATIENT)
Dept: OTHER | Age: 38
End: 2018-10-05

## 2018-11-03 RX ORDER — NORETHINDRONE ACETATE AND ETHINYL ESTRADIOL AND FERROUS FUMARATE 1MG-20(21)
KIT ORAL
Qty: 84 TAB | Refills: 3 | Status: SHIPPED | OUTPATIENT
Start: 2018-11-03 | End: 2019-10-22 | Stop reason: SDUPTHER

## 2019-10-21 NOTE — PROGRESS NOTES
Edison Mendez is a 35 y.o. female here for   Chief Complaint   Patient presents with    PCOS     LV 9/2018)       1. Have you been to the ER, urgent care clinic since your last visit? Hospitalized since your last visit? -no    2. Have you seen or consulted any other health care providers outside of the [address] [place] [address] since your last visit?   Include any pap smears or colon screening.-no

## 2019-10-22 ENCOUNTER — OFFICE VISIT (OUTPATIENT)
Dept: ENDOCRINOLOGY | Age: 39
End: 2019-10-22

## 2019-10-22 VITALS
SYSTOLIC BLOOD PRESSURE: 129 MMHG | BODY MASS INDEX: 44.41 KG/M2 | RESPIRATION RATE: 16 BRPM | WEIGHT: 293 LBS | OXYGEN SATURATION: 98 % | DIASTOLIC BLOOD PRESSURE: 56 MMHG | TEMPERATURE: 97.2 F | HEART RATE: 92 BPM | HEIGHT: 68 IN

## 2019-10-22 DIAGNOSIS — R73.02 GLUCOSE INTOLERANCE (IMPAIRED GLUCOSE TOLERANCE): ICD-10-CM

## 2019-10-22 DIAGNOSIS — E28.2 PCOS (POLYCYSTIC OVARIAN SYNDROME): Primary | ICD-10-CM

## 2019-10-22 DIAGNOSIS — E66.01 MORBID OBESITY (HCC): ICD-10-CM

## 2019-10-22 DIAGNOSIS — L68.0 HIRSUTISM: ICD-10-CM

## 2019-10-22 DIAGNOSIS — E28.2 PCOS (POLYCYSTIC OVARIAN SYNDROME): ICD-10-CM

## 2019-10-22 DIAGNOSIS — R53.82 CHRONIC FATIGUE: ICD-10-CM

## 2019-10-22 RX ORDER — NORETHINDRONE ACETATE AND ETHINYL ESTRADIOL 1MG-20(21)
KIT ORAL
Qty: 84 TAB | Refills: 3 | Status: SHIPPED | OUTPATIENT
Start: 2019-10-22 | End: 2020-01-08 | Stop reason: SDUPTHER

## 2019-10-22 RX ORDER — SUMATRIPTAN 50 MG/1
50 TABLET, FILM COATED ORAL AS NEEDED
Refills: 5 | COMMUNITY
Start: 2019-10-03

## 2019-10-22 RX ORDER — SPIRONOLACTONE 100 MG/1
TABLET, FILM COATED ORAL
Qty: 90 TAB | Refills: 3 | Status: SHIPPED | OUTPATIENT
Start: 2019-10-22 | End: 2020-10-07

## 2019-10-22 RX ORDER — PROPRANOLOL HYDROCHLORIDE 80 MG/1
1 CAPSULE, EXTENDED RELEASE ORAL DAILY
COMMUNITY
Start: 2019-10-03 | End: 2019-10-22

## 2019-10-22 RX ORDER — GLUCOSAM/CHONDRO/HERB 149/HYAL 750-100 MG
1 TABLET ORAL DAILY
COMMUNITY

## 2019-10-22 NOTE — PROGRESS NOTES
Dashawn Robert MD                Patient Information  Date:10/22/2019  Name : Jonnie Christianson 35 y.o.     YOB: 1980         Referred by: Self referred      Chief Complaint   Patient presents with    PCOS     LV 9/2018)       History of Present Illness: Jonnie Christianson is a 35 y.o. female here for follow up       When she took Contrave along with metformin she had diarrhea, diarrhea improved after stopping metformin. Did not have any problem with metformin before starting Contrave. Lost weight with contrave, stopped medication due to insurance not covering,  She wants to go back on Contrave    Was on qsymia, had kidney stone and hence was discontinued         Hx CSF leak      She was diagnosed with PCOS several years ago, managed by Dr. Sadiq Case, endocrinologist.   menarche at age 14 years. Wt Readings from Last 3 Encounters:   10/22/19 314 lb 12.8 oz (142.8 kg)   09/21/18 300 lb 3.2 oz (136.2 kg)   03/16/18 300 lb (136.1 kg)     BP Readings from Last 3 Encounters:   10/22/19 129/56   09/21/18 128/57   03/16/18 131/54       Past Medical History:   Diagnosis Date    Hirsutism 3/13/2015    Migraine     Obesity 3/13/2015    PCOS (polycystic ovarian syndrome)      Past Surgical History:   Procedure Laterality Date    HX OTHER SURGICAL      DNC    HX OTHER SURGICAL  04/18/2017    brain surgery for ceffleak    HX TONSILLECTOMY       Current Outpatient Medications   Medication Sig    PNV No12-Iron-FA-DSS-OM-3 29 mg iron-1 mg -50 mg CPKD Take  by mouth.  omega 3-DHA-EPA-fish oil 1,000 mg (120 mg-180 mg) capsule Take 1 Cap by mouth daily.  spironolactone (ALDACTONE) 100 mg tablet TAKE 1 TABLET NIGHTLY    JUNEL FE 1/20, 28, 1 mg-20 mcg (21)/75 mg (7) tab TAKE 1 TABLET DAILY    cyclobenzaprine (FLEXERIL) 10 mg tablet Take 10 mg by mouth as needed for Muscle Spasm(s).  meclizine (ANTIVERT) 25 mg tablet Take 25 mg by mouth as needed. vertigo    scopolamine (TRANSDERM-SCOP) 1.5 mg 1 Patch by TransDERmal route as needed.  amitriptyline (ELAVIL) 25 mg tablet Take 50-75 mg by mouth nightly.  EPINEPHrine (EPIPEN) 0.3 mg/0.3 mL (1:1,000) injection 0.3 mg by IntraMUSCular route as needed.  fluticasone (FLONASE) 50 mcg/actuation nasal spray 2 Sprays by Both Nostrils route as needed for Rhinitis.  butalbital-aspirin-caffeine (FIORINAL) capsule Take 1 Cap by mouth as needed for Pain.  propranolol LA (INDERAL LA) 80 mg SR capsule Take 1 Cap by mouth daily.  SUMAtriptan (IMITREX) 50 mg tablet as needed.  naltrexone-buPROPion (CONTRAVE) 8-90 mg TbER ER tablet Take 2 Tabs by mouth two (2) times a day.  naltrexone-buPROPion (CONTRAVE) 8-90 mg TbER ER tablet Week 1 1 tab PO QAM, Week 2 1QAM 1QHS, Week 3 2QAM 1 QHS, Week 4 & beyond 2QAM 2QHS     No current facility-administered medications for this visit. Allergies   Allergen Reactions    Penicillins Other (comments)         Review of Systems:  - Constitutional Symptoms: no fevers, chills  - Eyes: no blurry vision no double vision  - Cardiovascular: no chest pain no palpitations  - Respiratory: no cough no shortness of breath  - Gastrointestinal: no dysphagia no abdominal pain  - Musculoskeletal: no joint pains + weakness  - Integumentary: no rashes  - Neurological: no numbness, tingling,  headaches  -     Physical Examination:  - Blood pressure 129/56, pulse 92, temperature 97.2 °F (36.2 °C), temperature source Oral, resp. rate 16, height 5' 8\" (1.727 m), weight 314 lb 12.8 oz (142.8 kg), SpO2 98 %. Body mass index is 47.87 kg/m².   - General: pleasant, no distress, good eye contact, obese  - HEENT: no exopthalmos, no periorbital edema, no scleral/conjunctival injection, EOMI, Coarse thick hair chin and upper lip  - Neck: supple, no thyromegaly,   Facial erythema  - Cardiovascular: regular, normal rate, normal S1 and S2  - Respiratory: clear to auscultation bilaterally  - Gastrointestinal: soft, nontender, nondistended,BS +  - Musculoskeletal:, no rashes, no edema  - Neurological: Alert and oriented, no tremor  - Psychiatric: normal mood and affect        Assessment/Plan:     1. PCOS (polycystic ovarian syndrome)    2. Hirsutism    3. Glucose intolerance (impaired glucose tolerance)        1. PCOS. Could not tolerate metformin along with contrave  Discussed carbohydrate portion control, increase activity . On oral contraceptives    2. Hirsutism. Oral contraceptives in combination with Spironolactone. She was  also advised against pregnancy while she is on spironolactone because of teratogenic effects and hence needs to be taken with OCPS. Laser therapy and other epilation methods also helps. 4.  Morbid obesity  Worked up for Cushing's, congenital adrenal hyperplasia, non-classical - negative in the past.  Low-dose late-night dexamethasone suppression test as well as salivary cortisol which were both normal.  Exercise   hold metformin while she is on Contrave -maybe combination was causing more diarrhea, low carbohydrate diet     No Qsymia due to nephrolithiasis  Resume Contrave    5. Nephrolithiasis: 2 episodes  Followed by urology  Type of the stone unknown  Hydration, low-salt diet          Joo Collado MD      There are no Patient Instructions on file for this visit.        Patient verbalized understanding

## 2019-10-22 NOTE — LETTER
10/24/19 Patient: Beau Osler YOB: 1980 Date of Visit: 10/22/2019 Roosevelt Child MD 
[address] [facility] [address] [place] [address] VIA Facsimile: [phone] Dear Roosevelt Child MD, Thank you for referring Ms. Cheryl Morales to [id number] [place] [address] for evaluation. My notes for this consultation are attached. If you have questions, please do not hesitate to call me. I look forward to following your patient along with you. Sincerely, Tabitha Hale MD

## 2020-01-08 DIAGNOSIS — E66.01 MORBID OBESITY (HCC): ICD-10-CM

## 2020-01-08 DIAGNOSIS — E28.2 PCOS (POLYCYSTIC OVARIAN SYNDROME): ICD-10-CM

## 2020-01-08 DIAGNOSIS — R73.02 GLUCOSE INTOLERANCE (IMPAIRED GLUCOSE TOLERANCE): ICD-10-CM

## 2020-01-08 DIAGNOSIS — L68.0 HIRSUTISM: ICD-10-CM

## 2020-01-08 RX ORDER — NORETHINDRONE ACETATE AND ETHINYL ESTRADIOL 1MG-20(21)
KIT ORAL
Qty: 84 TAB | Refills: 3 | Status: SHIPPED | OUTPATIENT
Start: 2020-01-08

## 2020-01-13 ENCOUNTER — TELEPHONE (OUTPATIENT)
Dept: ENDOCRINOLOGY | Age: 40
End: 2020-01-13

## 2020-01-13 NOTE — TELEPHONE ENCOUNTER
Dr Jovani Parker has sent a new prescription for another type of birth control back in November 2019 and pat has filled it on 12/2019 with 4 refills remaining. Elena Avendano has been cancelled.

## 2020-04-20 PROBLEM — F43.10 PTSD (POST-TRAUMATIC STRESS DISORDER): Status: ACTIVE | Noted: 2019-05-03

## 2020-04-21 ENCOUNTER — VIRTUAL VISIT (OUTPATIENT)
Dept: ENDOCRINOLOGY | Age: 40
End: 2020-04-21

## 2020-04-21 ENCOUNTER — TELEPHONE (OUTPATIENT)
Dept: ENDOCRINOLOGY | Age: 40
End: 2020-04-21

## 2020-04-21 DIAGNOSIS — E28.2 PCOS (POLYCYSTIC OVARIAN SYNDROME): ICD-10-CM

## 2020-04-21 DIAGNOSIS — R73.02 GLUCOSE INTOLERANCE (IMPAIRED GLUCOSE TOLERANCE): ICD-10-CM

## 2020-04-21 DIAGNOSIS — L68.0 HIRSUTISM: ICD-10-CM

## 2020-04-21 DIAGNOSIS — E66.01 MORBID OBESITY (HCC): ICD-10-CM

## 2020-04-21 DIAGNOSIS — E28.2 PCOS (POLYCYSTIC OVARIAN SYNDROME): Primary | ICD-10-CM

## 2020-04-21 RX ORDER — METFORMIN HYDROCHLORIDE 500 MG/1
500 TABLET, EXTENDED RELEASE ORAL 2 TIMES DAILY WITH MEALS
Qty: 180 TAB | Refills: 3 | Status: SHIPPED | OUTPATIENT
Start: 2020-04-21 | End: 2021-07-30

## 2020-04-21 RX ORDER — METFORMIN HYDROCHLORIDE 500 MG/1
500 TABLET, EXTENDED RELEASE ORAL 2 TIMES DAILY WITH MEALS
Qty: 60 TAB | Refills: 0 | Status: SHIPPED | OUTPATIENT
Start: 2020-04-21 | End: 2021-07-30 | Stop reason: SDUPTHER

## 2020-04-21 NOTE — PROGRESS NOTES
Britton Celestin MD                Patient Information  Date:4/21/2020  Name : Billy Williamson 35 y.o.     YOB: 1980         Referred by: Self referred      Chief Complaint   Patient presents with    PCOS   Pursuant to the emergency declaration under the [facility] [place] [address] [facility] authority and the [employer] Act, this Virtual  Visit was conducted, with patient's consent, to reduce the patient's risk of exposure to COVID-19 . Patient  is aware that this is a billable encounter and is responsible for copays/deductibles       Services were provided through a video synchronous discussion virtually to substitute for in-person clinic visit. Place of service: Provider : Office  Patient: Home    History of Present Illness: Billy Williamson is a 35 y.o. female here for follow up       She is not on Contrave ,not on metformin  Gained the weight back  Lost weight initially with Contrave, stopped medication due to insurance not covering,  She wants to go back on Contrave  Noted more hair growth on the chin  On spironolactone, OCPs  No steroids recently  Working from home  No chest pain, shortness of breath, no further kidney stones, no blood pressure issues  She is not on any narcotics    Was on qsymia, had kidney stone and hence was discontinued         Hx CSF leak      She was diagnosed with PCOS several years ago, managed by Dr. Cosme Vieira, endocrinologist.   menarche at age 14 years.           Wt Readings from Last 3 Encounters:   10/22/19 314 lb 12.8 oz (142.8 kg)   09/21/18 300 lb 3.2 oz (136.2 kg)   03/16/18 300 lb (136.1 kg)     BP Readings from Last 3 Encounters:   10/22/19 129/56   09/21/18 128/57   03/16/18 131/54       Past Medical History:   Diagnosis Date    Hirsutism 3/13/2015    Migraine     Obesity 3/13/2015    PCOS (polycystic ovarian syndrome)      Past Surgical History:   Procedure Laterality Date    HX OTHER SURGICAL      DNC    HX OTHER SURGICAL  04/18/2017    brain surgery for ceffleak    HX TONSILLECTOMY       Current Outpatient Medications   Medication Sig    multivitamin with minerals (HAIR,SKIN AND NAILS PO) Take  by mouth.  norethindrone-ethinyl estradiol (JUNEL FE 1/20, 28,) 1 mg-20 mcg (21)/75 mg (7) tab TAKE 1 TABLET DAILY    PNV No12-Iron-FA-DSS-OM-3 29 mg iron-1 mg -50 mg CPKD Take  by mouth.  omega 3-DHA-EPA-fish oil 1,000 mg (120 mg-180 mg) capsule Take 1 Cap by mouth daily.  spironolactone (ALDACTONE) 100 mg tablet TAKE 1 TABLET NIGHTLY    cyclobenzaprine (FLEXERIL) 10 mg tablet Take 10 mg by mouth as needed for Muscle Spasm(s).  scopolamine (TRANSDERM-SCOP) 1.5 mg 1 Patch by TransDERmal route as needed.  amitriptyline (ELAVIL) 25 mg tablet Take 50-75 mg by mouth nightly.  EPINEPHrine (EPIPEN) 0.3 mg/0.3 mL (1:1,000) injection 0.3 mg by IntraMUSCular route as needed.  fluticasone (FLONASE) 50 mcg/actuation nasal spray 2 Sprays by Both Nostrils route as needed for Rhinitis.  SUMAtriptan (IMITREX) 50 mg tablet as needed.  naltrexone-buPROPion (CONTRAVE) 8-90 mg TbER ER tablet Take 2 Tabs by mouth two (2) times a day.  naltrexone-buPROPion (CONTRAVE) 8-90 mg TbER ER tablet Week 1 1 tab PO QAM, Week 2 1QAM 1QHS, Week 3 2QAM 1 QHS, Week 4 & beyond 2QAM 2QHS    meclizine (ANTIVERT) 25 mg tablet Take 25 mg by mouth as needed. vertigo     No current facility-administered medications for this visit.       Allergies   Allergen Reactions    Penicillins Other (comments)         Review of Systems:  - Constitutional Symptoms: no fevers, chills  - Eyes: no blurry vision no double vision  - Cardiovascular: no chest pain no palpitations  - Respiratory: no cough no shortness of breath  - Gastrointestinal: no dysphagia no abdominal pain  - Musculoskeletal: no joint pains + weakness  - Integumentary: no rashes  - Neurological: no numbness, tingling,  headaches  -     Physical Examination:  - There were no vitals taken for this visit. There is no height or weight on file to calculate BMI. - General: pleasant, no distress, good eye contact  - HEENT: no exophthalmos, no periorbital edema, EOMI  - Neck: No visible thyromegaly  - RS: Normal respiratory effort  - Musculoskeletal: no tremors  - Neurological: alert and oriented  - Psychiatric: normal mood and affect  - Skin: Normal color        Assessment/Plan:     1. PCOS (polycystic ovarian syndrome)    2. Hirsutism        1. PCOS. Could not tolerate metformin along with  contrave initially but then she was able to tolerate metformin along with Contrave   On oral contraceptives    2. Hirsutism. Oral contraceptives in combination with Spironolactone. She was  also advised against pregnancy while she is on spironolactone because of teratogenic effects and hence needs to be taken with OCPS. Laser therapy and other epilation methods also helps. 4.  Morbid obesity  Worked up for Cushing's, congenital adrenal hyperplasia, non-classical - negative in the past.  Low-dose late-night dexamethasone suppression test as well as salivary cortisol which were both normal.  Increase activity    No Qsymia due to nephrolithiasis      5. Nephrolithiasis: 2 episodes  Followed by urology  Type of the stone unknown  Hydration, low-salt diet          Jesus Joy MD      There are no Patient Instructions on file for this visit.        Patient verbalized understanding

## 2020-04-21 NOTE — TELEPHONE ENCOUNTER
Informed pt that it is the same strength that she was on before and there is no need to change. Pt verbalized understandiong and states she wants a 30 day supply of metformin sent to [facility] and the 90 day supply sent to [facility].

## 2020-04-21 NOTE — PROGRESS NOTES
Salomón Briscoe is a 35 y.o. female here for   Chief Complaint   Patient presents with    PCOS       1. Have you been to the ER, urgent care clinic since your last visit? Hospitalized since your last visit? -no    2. Have you seen or consulted any other health care providers outside of the [address] [place] [address] since your last visit? Include any pap smears or colon screening. -PCP    Order placed for pt,  per Verbal Order with read back from Dr Ayesha Shaw 4/21/2020

## 2020-04-21 NOTE — TELEPHONE ENCOUNTER
Pt states she is on Junel Fe 24 1 mg-0.02 mg. Pt states if physician wants to change or feels there is something better, then it can be changed.

## 2021-01-04 DIAGNOSIS — E28.2 PCOS (POLYCYSTIC OVARIAN SYNDROME): ICD-10-CM

## 2021-01-04 DIAGNOSIS — L68.0 HIRSUTISM: ICD-10-CM

## 2021-01-04 DIAGNOSIS — E66.01 MORBID OBESITY (HCC): ICD-10-CM

## 2021-01-04 DIAGNOSIS — R73.02 GLUCOSE INTOLERANCE (IMPAIRED GLUCOSE TOLERANCE): ICD-10-CM

## 2021-01-04 RX ORDER — SPIRONOLACTONE 100 MG/1
TABLET, FILM COATED ORAL
Qty: 90 TAB | Refills: 3 | Status: SHIPPED | OUTPATIENT
Start: 2021-01-04 | End: 2021-07-30 | Stop reason: SDUPTHER

## 2021-07-30 ENCOUNTER — OFFICE VISIT (OUTPATIENT)
Dept: ENDOCRINOLOGY | Age: 41
End: 2021-07-30
Payer: COMMERCIAL

## 2021-07-30 VITALS
RESPIRATION RATE: 16 BRPM | HEIGHT: 68 IN | TEMPERATURE: 98.1 F | BODY MASS INDEX: 38.19 KG/M2 | SYSTOLIC BLOOD PRESSURE: 125 MMHG | OXYGEN SATURATION: 99 % | DIASTOLIC BLOOD PRESSURE: 42 MMHG | HEART RATE: 81 BPM | WEIGHT: 252 LBS

## 2021-07-30 DIAGNOSIS — R73.02 GLUCOSE INTOLERANCE (IMPAIRED GLUCOSE TOLERANCE): ICD-10-CM

## 2021-07-30 DIAGNOSIS — L68.0 HIRSUTISM: ICD-10-CM

## 2021-07-30 DIAGNOSIS — E28.2 PCOS (POLYCYSTIC OVARIAN SYNDROME): ICD-10-CM

## 2021-07-30 DIAGNOSIS — E66.01 MORBID OBESITY (HCC): ICD-10-CM

## 2021-07-30 DIAGNOSIS — E28.2 PCOS (POLYCYSTIC OVARIAN SYNDROME): Primary | ICD-10-CM

## 2021-07-30 PROCEDURE — 99214 OFFICE O/P EST MOD 30 MIN: CPT | Performed by: INTERNAL MEDICINE

## 2021-07-30 RX ORDER — SEMAGLUTIDE 1.34 MG/ML
0.25 INJECTION, SOLUTION SUBCUTANEOUS
COMMUNITY

## 2021-07-30 RX ORDER — SPIRONOLACTONE 100 MG/1
TABLET, FILM COATED ORAL
Qty: 90 TABLET | Refills: 3 | Status: SHIPPED | OUTPATIENT
Start: 2021-07-30 | End: 2022-09-06

## 2021-07-30 NOTE — LETTER
8/7/2021    Patient: Derrick Mcnamara   YOB: 1980   Date of Visit: 7/30/2021     Ella Cotton MD  [address] [address]  Via Fax: [phone]    Dear Ella Cotton MD,      Thank you for referring Ms. Jose Eduardo Mullins to [address] [place] [address] for evaluation. My notes for this consultation are attached. If you have questions, please do not hesitate to call me. I look forward to following your patient along with you.       Sincerely,    Darrel Gil MD

## 2021-07-30 NOTE — PROGRESS NOTES
Tatiana Traylor is a 38 y.o. female here for   Chief Complaint   Patient presents with    PCOS    Hirsutism       1. Have you been to the ER, urgent care clinic since your last visit? Hospitalized since your last visit? -no    2. Have you seen or consulted any other health care providers outside of the [address] [place] [address] since your last visit?   Include any pap smears or colon screening.-no

## 2021-07-30 NOTE — PROGRESS NOTES
Megan Neal MD                Patient Information  Date:7/30/2021  Name : Loren Parekh 38 y.o.     YOB: 1980         Referred by: Self referred      Chief Complaint   Patient presents with    PCOS    Hirsutism       History of Present Illness: Loren Parekh is a 38 y.o. female here for follow up     She has lost significant amount of weight on low-carb diet, close to 50-60 pounds, original weight 320 pounds, now on Ozempic which she is tolerating low-dose. Started by Dr. Antwan Austin to start intermittent fasting, significant other is doing with her. She is very motivated. Feels a lot better    Lost weight initially with Contrave, stopped medication due to insurance not covering,  On spironolactone, OCPs  No steroids recently  Was on qsymia, had kidney stone and hence was discontinued         Hx CSF leak      She was diagnosed with PCOS several years ago, managed by Dr. Josh Montgomery, endocrinologist.   menarche at age 14 years. Wt Readings from Last 3 Encounters:   07/30/21 252 lb (114.3 kg)   10/22/19 314 lb 12.8 oz (142.8 kg)   09/21/18 300 lb 3.2 oz (136.2 kg)     BP Readings from Last 3 Encounters:   07/30/21 (!) 125/42   10/22/19 129/56   09/21/18 128/57       Past Medical History:   Diagnosis Date    Hirsutism 3/13/2015    Migraine     Obesity 3/13/2015    PCOS (polycystic ovarian syndrome)      Past Surgical History:   Procedure Laterality Date    HX OTHER SURGICAL      DNC    HX OTHER SURGICAL  04/18/2017    brain surgery for ceffleak    HX TONSILLECTOMY       Current Outpatient Medications   Medication Sig    semaglutide (Ozempic) 0.25 mg or 0.5 mg/dose (2 mg/1.5 ml) subq pen 0.25 mg by SubCUTAneous route every seven (7) days.  spironolactone (ALDACTONE) 100 mg tablet TAKE 1 TABLET NIGHTLY    multivitamin with minerals (HAIR,SKIN AND NAILS PO) Take  by mouth.     norethindrone-ethinyl estradiol (JUNEL FE 1/20, 28,) 1 mg-20 mcg (21)/75 mg (7) tab TAKE 1 TABLET DAILY    SUMAtriptan (IMITREX) 50 mg tablet Take 50 mg by mouth as needed.  PNV No12-Iron-FA-DSS-OM-3 29 mg iron-1 mg -50 mg CPKD Take  by mouth.  omega 3-DHA-EPA-fish oil 1,000 mg (120 mg-180 mg) capsule Take 1 Cap by mouth daily.  cyclobenzaprine (FLEXERIL) 10 mg tablet Take 10 mg by mouth as needed for Muscle Spasm(s).  scopolamine (TRANSDERM-SCOP) 1.5 mg 1 Patch by TransDERmal route as needed.  amitriptyline (ELAVIL) 25 mg tablet Take 50-75 mg by mouth nightly.  EPINEPHrine (EPIPEN) 0.3 mg/0.3 mL (1:1,000) injection 0.3 mg by IntraMUSCular route as needed.  fluticasone (FLONASE) 50 mcg/actuation nasal spray 2 Sprays by Both Nostrils route as needed for Rhinitis.  naltrexone-buPROPion (CONTRAVE) 8-90 mg TbER ER tablet Take 2 Tabs by mouth two (2) times a day. (Patient not taking: Reported on 7/30/2021)    metFORMIN ER (GLUCOPHAGE XR) 500 mg tablet Take 1 Tab by mouth two (2) times daily (with meals). (Patient not taking: Reported on 7/30/2021)     No current facility-administered medications for this visit. Allergies   Allergen Reactions    Penicillins Other (comments)         Review of Systems:  - Per HPI    Physical Examination:  - Blood pressure (!) 125/42, pulse 81, temperature 98.1 °F (36.7 °C), temperature source Temporal, resp. rate 16, height 5' 8\" (1.727 m), weight 252 lb (114.3 kg), SpO2 99 %. Body mass index is 38.32 kg/m². - General: pleasant, no distress, good eye contact  - HEENT: no exophthalmos, no periorbital edema, EOMI  - Neck: No visible thyromegaly  - RS: Normal respiratory effort  - Musculoskeletal: no tremors  - Neurological: alert and oriented  - Psychiatric: normal mood and affect  - Skin: Normal color        Assessment/Plan:     1. PCOS (polycystic ovarian syndrome)    2. Hirsutism    3. Glucose intolerance (impaired glucose tolerance)        1. PCOS. Could not tolerate metformin .  On oral contraceptives    2. Hirsutism. Oral contraceptives in combination with Spironolactone. She was  also advised against pregnancy while she is on spironolactone because of teratogenic effects and hence needs to be taken with OCPS. Laser therapy and other epilation methods also helps. Papsmear - 2021 ,negative  Due for  mammogram        4. Morbid obesity  Worked up for Cushing's, congenital adrenal hyperplasia, non-classical - negative in the past.  Low-dose late-night dexamethasone suppression test as well as salivary cortisol which were both normal.  Increase activity    No Qsymia due to nephrolithiasis  Original weight 320 lbs     5. Nephrolithiasis: 2 episodes  Followed by urology  Type of the stone unknown  Hydration, low-salt diet        Charito Turk MD      There are no Patient Instructions on file for this visit.        Patient verbalized understanding

## 2021-11-22 ENCOUNTER — OFFICE VISIT (OUTPATIENT)
Dept: SLEEP MEDICINE | Age: 41
End: 2021-11-22
Payer: COMMERCIAL

## 2021-11-22 VITALS — HEIGHT: 68 IN | WEIGHT: 227 LBS | BODY MASS INDEX: 34.4 KG/M2

## 2021-11-22 DIAGNOSIS — G47.33 OSA (OBSTRUCTIVE SLEEP APNEA): Primary | ICD-10-CM

## 2021-11-22 PROCEDURE — 99204 OFFICE O/P NEW MOD 45 MIN: CPT | Performed by: INTERNAL MEDICINE

## 2021-11-22 NOTE — PROGRESS NOTES
[address] [facility] [place]. [place], [address]  Tel.  [phone]  Fax. [address] [facility] [address]  [place], [address] [facility]  Tel.  [phone]  Fax. [phone] [address]  Tel.  [phone]  Fax. [phone]       Leandra Peña is a 42y.o. year old female seen for evaluation of a sleep disorder. ASSESSMENT/PLAN:      ICD-10-CM ICD-9-CM    1. GERARDO (obstructive sleep apnea)  G47.33 327.23 SLEEP STUDY UNATTENDED, 4 CHANNEL   2. BMI 34.0-34.9,adult  Z68.34 V85.34        Patient has a history and examination consistent with the diagnosis of sleep apnea. Follow-up and Dispositions    · Return for telephone follow-up after testing is completed. * The patient currently has a Low Risk for having sleep apnea. STOP-BANG score 3.    * Sleep testing was ordered for initial evaluation. Orders Placed This Encounter    SLEEP STUDY UNATTENDED, 4 CHANNEL     Order Specific Question:   Reason for Exam     Answer:   GERARDO       * She was provided information on sleep apnea including corresponding risk factors and the importance of proper treatment. * Treatment options were reviewed in detail. she would like to proceed with PAP therapy (new supplies to be prescribed if indicated (nasal / FF mask and climate line tubing). Patient will be seen in follow-up in 6-8 weeks after PAP setup to gauge treatment response and adherence to therapy. * The patient was counseled regarding proper sleep hygiene, with emphasis on ensuring sufficient total sleep time; safe driving and the benefits of exercise and weight loss. * All of her questions were addressed. 2. Recommended a dedicated weight loss program through appropriate diet and exercise regimen as significant weight reduction has been shown to reduce severity of obstructive sleep apnea.      SUBJECTIVE/OBJECTIVE:    Leandra Peña is an 42 y.o. female referred for evaluation for a sleep disorder. She complains of snoring associated with awakening in the middle of the night because of snoring and non restorative sleep. Symptoms began several years ago, She was diagnosed with GERARDO and has been on CPAP therapy since that time. He latest device has been recalled, she has registreed the device for replacement and is currently using her S9 Elite device. She does report of a weight loss of about 100 Lbs since diagnosis. She usually can fall asleep in 35-40 minutes. Family or house members note snoring. She denies of symptoms indicative of cataplexy, sleep paralysis or sleep related hallucinations. She denies of a history of unusual movements occurring during sleep. [place] does wake up frequently at night. She is bothered by waking up too early and left unable to get back to sleep. She actually sleeps about 7 hours at night and wakes up about 5 times during the night. She does not work shifts: Dhruv Larry indicates she does get too little sleep at night. Her bedtime is 2300. She awakens at 0800. She does not take naps. She has the following observed behaviors: Grinding teeth; Nightmares. Other remarks: The patient has undergone diagnostic testing for the current problems. Review of Systems:  Constitutional:  Significant weight loss  ~ about 100 lbs  Eyes:  No blurred vision  CVS:  No significant chest pain  Pulm:  No significant shortness of breath  GI:  No significant nausea or vomiting  :  No significant nocturia  Musculoskeletal:  No significant joint pain at night  Skin:  No significant rashes  Neuro:  No significant dizziness   Psych:  No active mood issues    Sleep Review of Systems: notable for Positive difficulty falling asleep; Positive awakenings at night; Positive perceived regular dreaming; Positive nightmares; Negative  early morning headaches; Negative  memory problems; Negative  concentration issues;  Negative caffeine;  Negative alcohol;   Negative history of any automobile or occupational accidents due to daytime drowsiness. [place] Sleepiness Score: 4   and Modified F.O.S.Q. Score Total / 2: 20    Visit Vitals  Ht 5' 8\" (1.727 m)   Wt 227 lb (103 kg)   BMI 34.52 kg/m²           General:   Alert, oriented, not in acute distress   Eyes:  Anicteric Sclerae; intact EOM's   Nose:  No obvious nasal septum deviation    Oropharynx:   Mallampati score 4, thick tongue base, uvula not seen due to low-lying soft palate, narrow tonsilo-pharyngeal pilars, tongue scalloped   Neck:   midline trachea,  no JVD   Chest/Lungs:  symmetrical lung expansion ,clear lung fields on auscultation    CVS:  Normal rate, regular rhythm    Extremities:  No obvious rashes, absent edema    Neuro:  No focal deficits; No obvious tremor    Psych:  Normal eye contact; normal  affect, normal countenance      Patient's phone number [phone] (cell)  was reviewed and confirmed for accuracy. She gives permission for messages regarding results and appointments to be left at that number. Lisa Middleton MD, FAASM  Diplomate American Board of Sleep Medicine  Diplomate in Sleep Medicine - ABP    Electronically signed.  11/22/21

## 2021-11-22 NOTE — PATIENT INSTRUCTIONS
[address] [facility] [place]. [place], [address]  Tel.  [phone]  Fax. [address] [facility] 60  [place], [address] [facility]  Tel.  [phone]  Fax. [phone] [address]  Tel.  [phone]  Fax. [phone]     Sleep Apnea: After Your Visit  Your Care Instructions  Sleep apnea occurs when you frequently stop breathing for 10 seconds or longer during sleep. It can be mild to severe, based on the number of times per hour that you stop breathing or have slowed breathing. Blocked or narrowed airways in your nose, mouth, or throat can cause sleep apnea. Your airway can become blocked when your throat muscles and tongue relax during sleep. Sleep apnea is common, occurring in 1 out of 20 individuals. Individuals having any of the following characteristics should be evaluated and treated right away due to high risk and detrimental consequences from untreated sleep apnea:  1. Obesity  2. Congestive Heart failure  3. Atrial Fibrillation  4. Uncontrolled Hypertension  5. Type II Diabetes  6. Night-time Arrhythmias  7. Stroke  8. Pulmonary Hypertension  9. High-risk Driving Populations (pilots, truck drivers, etc.)  10. Patients Considering Weight-loss Surgery    How do you know you have sleep apnea? You probably have sleep apnea if you answer 'yes' to 3 or more of the following questions:  S - Have you been told that you Snore? T - Are you often Tired during the day? O - Has anyone Observed you stop breathing while sleeping? P- Do you have (or are being treated for) high blood Pressure? B - Are you obese (Body Mass Index > 35)? A - Is your Age 48years old or older? N - Is your Neck size greater than 16 inches? G - Are you male Gender? A sleep physician can prescribe a breathing device that prevents tissues in the throat from blocking your airway.  Or your doctor may recommend using a dental device (oral breathing device) to help keep your airway open. In some cases, surgery may be needed to remove enlarged tissues in the throat. Follow-up care is a key part of your treatment and safety. Be sure to make and go to all appointments, and call your doctor if you are having problems. It's also a good idea to know your test results and keep a list of the medicines you take. How can you care for yourself at home? · Lose weight, if needed. It may reduce the number of times you stop breathing or have slowed breathing. · Go to bed at the same time every night. · Sleep on your side. It may stop mild apnea. If you tend to roll onto your back, sew a pocket in the back of your pajama top. Put a tennis ball into the pocket, and stitch the pocket shut. This will help keep you from sleeping on your back. · Avoid alcohol and medicines such as sleeping pills and sedatives before bed. · Do not smoke. Smoking can make sleep apnea worse. If you need help quitting, talk to your doctor about stop-smoking programs and medicines. These can increase your chances of quitting for good. · Prop up the head of your bed 4 to 6 inches by putting bricks under the legs of the bed. · Treat breathing problems, such as a stuffy nose, caused by a cold or allergies. · Use a continuous positive airway pressure (CPAP) breathing machine if lifestyle changes do not help your apnea and your doctor recommends it. The machine keeps your airway from closing when you sleep. · If CPAP does not help you, ask your doctor whether you should try other breathing machines. A bilevel positive airway pressure machine has two types of air pressureâone for breathing in and one for breathing out. Another device raises or lowers air pressure as needed while you breathe. · If your nose feels dry or bleeds when using one of these machines, talk with your doctor about increasing moisture in the air. A humidifier may help.   · If your nose is runny or stuffy from using a breathing machine, talk with your doctor about using decongestants or a corticosteroid nasal spray. When should you call for help? Watch closely for changes in your health, and be sure to contact your doctor if:  · You still have sleep apnea even though you have made lifestyle changes. · You are thinking of trying a device such as CPAP. · You are having problems using a CPAP or similar machine. Where can you learn more? Go to [employer]. Enter P357 in the search box to learn more about \"Sleep Apnea: After Your Visit. \"   © 7173-4217 Healthwise, Incorporated. Care instructions adapted under license by [employer] (which disclaims liability or warranty for this information). This care instruction is for use with your licensed healthcare professional. If you have questions about a medical condition or this instruction, always ask your healthcare professional. Melecio Reus any warranty or liability for your use of this information. PROPER SLEEP HYGIENE    What to avoid  · Do not have drinks with caffeine, such as coffee or black tea, for 8 hours before bed. · Do not smoke or use other types of tobacco near bedtime. Nicotine is a stimulant and can keep you awake. · Avoid drinking alcohol late in the evening, because it can cause you to wake in the middle of the night. · Do not eat a big meal close to bedtime. If you are hungry, eat a light snack. · Do not drink a lot of water close to bedtime, because the need to urinate may wake you up during the night. · Do not read or watch TV in bed. Use the bed only for sleeping and sexual activity. What to try  · Go to bed at the same time every night, and wake up at the same time every morning. Do not take naps during the day. · Keep your bedroom quiet, dark, and cool. · Get regular exercise, but not within 3 to 4 hours of your bedtime. .  · Sleep on a comfortable pillow and mattress.   · If watching the clock makes you anxious, turn it facing away from you so you cannot see the time. · If you worry when you lie down, start a worry book. Well before bedtime, write down your worries, and then set the book and your concerns aside. · Try meditation or other relaxation techniques before you go to bed. · If you cannot fall asleep, get up and go to another room until you feel sleepy. Do something relaxing. Repeat your bedtime routine before you go to bed again. · Make your house quiet and calm about an hour before bedtime. Turn down the lights, turn off the TV, log off the computer, and turn down the volume on music. This can help you relax after a busy day. Drowsy Driving  The [address] [place] [address] Road Traffic Safety Administration cites drowsiness as a causing factor in more than 776,896 police reported crashes annually, resulting in 76,000 injuries and 1,500 deaths. Other surveys suggest 55% of people polled have driven while drowsy in the past year, 23% had fallen asleep but not crashed, 3% crashed, and 2% had and accident due to drowsy driving. Who is at risk? Young Drivers: One study of drowsy driving accidents states that 55% of the drivers were under 25 years. Of those, 75% were male. Shift Workers and Travelers: People who work overnight or travel across time zones frequently are at higher risk of experiencing Circadian Rhythm Disorders. They are trying to work and function when their body is programed to sleep. Sleep Deprived: Lack of sleep has a serious impact on your ability to pay attention or focus on a task. Consistently getting less than the average of 8 hours your body needs creates partial or cumulative sleep deprivation. Untreated Sleep Disorders: Sleep Apnea, Narcolepsy, R.L.S., and other sleep disorders (untreated) prevent a person from getting enough restful sleep. This leads to excessive daytime sleepiness and increases the risk for drowsy driving accidents by up to 7 times.   Medications / Alcohol: Even over the counter medications can cause drowsiness. Medications that impair a drivers attention should have a warning label. Alcohol naturally makes you sleepy and on its own can cause accidents. Combined with excessive drowsiness its effects are amplified. Signs of Drowsy Driving:   * You don't remember driving the last few miles   * You may drift out of your adrian   * You are unable to focus and your thoughts wander   * You may yawn more often than normal   * You have difficulty keeping your eyes open / nodding off   * Missing traffic signs, speeding, or tailgating  Prevention-   Good sleep hygiene, lifestyle and behavioral choices have the most impact on drowsy driving. There is no substitute for sleep and the average person requires 8 hours nightly. If you find yourself driving drowsy, stop and sleep. Consider the sleep hygiene tips provided during your visit as well. Medication Refill Policy: Refills for all medications require 1 week advance notice. Please have your pharmacy fax a refill request. We are unable to fax, or call in \"controled substance\" medications and you will need to pick these prescriptions up from our office. GENIAC Activation    Thank you for requesting access to GENIAC. Please follow the instructions below to securely access and download your online medical record. GENIAC allows you to send messages to your doctor, view your test results, renew your prescriptions, schedule appointments, and more. How Do I Sign Up? 1. In your internet browser, go to [email or web address]. 2. Click on the First Time User? Click Here link in the Sign In box. You will see the New Member Sign Up page. 3. Enter your GENIAC Access Code exactly as it appears below. You will not need to use this code after youve completed the sign-up process. If you do not sign up before the expiration date, you must request a new code. GENIAC Access Code:  Activation code not generated  Current GENIAC Status: Active (This is the date your [employer]ell access code will )    4. Enter the last four digits of your Social Security Number (xxxx) and Date of Birth (mm/dd/yyyy) as indicated and click Submit. You will be taken to the next sign-up page. 5. Create a [email or web address] ID. This will be your Pl[employer]ell login ID and cannot be changed, so think of one that is secure and easy to remember. 6. Create a [employer]ell password. You can change your password at any time. 7. Enter your Password Reset Question and Answer. This can be used at a later time if you forget your password. 8. Enter your e-mail address. You will receive e-mail notification when new information is available in [address] 9. Click Sign Up. You can now view and download portions of your medical record. 10. Click the Download Summary menu link to download a portable copy of your medical information. Additional Information    If you have questions, please call [phone]. Remember, Plasticell is NOT to be used for urgent needs. For medical emergencies, dial 911.

## 2021-11-29 ENCOUNTER — TELEPHONE (OUTPATIENT)
Dept: SLEEP MEDICINE | Age: 41
End: 2021-11-29

## 2021-11-29 ENCOUNTER — DOCUMENTATION ONLY (OUTPATIENT)
Dept: SLEEP MEDICINE | Age: 41
End: 2021-11-29

## 2021-11-29 DIAGNOSIS — G47.33 OSA (OBSTRUCTIVE SLEEP APNEA): Primary | ICD-10-CM

## 2021-11-29 NOTE — TELEPHONE ENCOUNTER
Orders Placed This Encounter    AMB SUPPLY ORDER     Diagnosis: (G47.33) GERARDO (obstructive sleep apnea)  (primary encounter diagnosis)     Replacement Supplies for Positive Airway Pressure Therapy Device:   Duration of need: 99 months.  Nasal Pillows Combo Mask (Replace) 2 per month.  Nasal Pillows (Replace) 2 per month.  Full Face Mask 1 every 3 months.  Full Face Mask Cushion 1 per month.  Nasal Cushion (Replace) 2 per month.  Nasal Interface Mask 1 every 3 months.  Headgear 1 every 6 months.  Chinstrap 1 every 6 months. Solo Matthews MD, FAASM; NPI: [id number]    Electronically signed.  Date:- 11/29/21

## 2021-12-02 ENCOUNTER — DOCUMENTATION ONLY (OUTPATIENT)
Dept: SLEEP MEDICINE | Age: 41
End: 2021-12-02

## 2021-12-09 ENCOUNTER — OFFICE VISIT (OUTPATIENT)
Dept: SLEEP MEDICINE | Age: 41
End: 2021-12-09

## 2021-12-09 ENCOUNTER — HOSPITAL ENCOUNTER (OUTPATIENT)
Dept: SLEEP MEDICINE | Age: 41
Discharge: HOME | End: 2021-12-09
Payer: COMMERCIAL

## 2021-12-09 DIAGNOSIS — G47.33 OSA (OBSTRUCTIVE SLEEP APNEA): Primary | ICD-10-CM

## 2021-12-09 PROCEDURE — 95806 SLEEP STUDY UNATT&RESP EFFT: CPT

## 2021-12-09 NOTE — PROGRESS NOTES
[address] [facility] [address] [place]. [place], [address]  Tel.  [phone]  Fax. 100 [facility] 60  [place], [address] [facility]  Tel.  [phone]  Fax. [phone] [address] [facility] [address]  Tel.  [phone]  Fax. [phone]       S>Arely Horner is a 42 y.o. female seen today to receive a home sleep testing unit (HST). · Patient was educated on proper hookup and operation of the HST. · Instruction forms and documentation were reviewed and signed. · The patient demonstrated good understanding of the HST.    O>    There were no vitals taken for this visit. A>  No diagnosis found. P>  · General information regarding operations and maintenance of the device was provided. · She was provided information on sleep apnea including coresponding risk factors and the importance of proper treatment. · Follow-up appointment was made to return the HST. She will be contacted once the results have been reviewed. · She was asked to contact our office for any problems regarding her home sleep test study.    · [facility] [id number]

## 2021-12-10 ENCOUNTER — TELEPHONE (OUTPATIENT)
Dept: SLEEP MEDICINE | Age: 41
End: 2021-12-10

## 2021-12-19 ENCOUNTER — TELEPHONE (OUTPATIENT)
Dept: SLEEP MEDICINE | Age: 41
End: 2021-12-19

## 2021-12-19 DIAGNOSIS — G47.33 OSA (OBSTRUCTIVE SLEEP APNEA): Primary | ICD-10-CM

## 2021-12-20 NOTE — TELEPHONE ENCOUNTER
Lex Boudreaux is to be contacted by lead sleep technologist regarding results of Sleep Testing which was indicative of an average AHI of 1.2 per hour with an SpO2 nicki of 91% and SpO2 of < 88% being 0.9 minutes. Patient should return for repeat home sleep apnea testing due to presence of significant risk factors for Obstructive Sleep Apnea - STOP- BANG 3. Encounter Diagnosis   Name Primary?     GERARDO (obstructive sleep apnea) Yes     Orders Placed This Encounter    SLEEP STUDY UNATTENDED, 4 CHANNEL     Order Specific Question:   Reason for Exam     Answer:   GERARDO

## 2022-03-07 ENCOUNTER — TELEPHONE (OUTPATIENT)
Dept: SLEEP MEDICINE | Age: 42
End: 2022-03-07

## 2022-03-07 DIAGNOSIS — G47.33 OSA (OBSTRUCTIVE SLEEP APNEA): Primary | ICD-10-CM

## 2022-03-07 NOTE — TELEPHONE ENCOUNTER
Most recent HSAT did not indicate presence of GERARDO following reported weight loss of 100 lbs. She should withhold Therapy for about a week and re-take the test.    Encounter Diagnosis   Name Primary?     GERARDO (obstructive sleep apnea) Yes     Orders Placed This Encounter    SLEEP STUDY UNATTENDED, 4 CHANNEL     Order Specific Question:   Reason for Exam     Answer:   GERARDO

## 2022-03-07 NOTE — TELEPHONE ENCOUNTER
Dr. Hector Sullivan,      Diane Shakila called and would like to get an order to buy a travel unit to have for traveling. Can you please do this order for her and we will email it to her once completed.

## 2022-03-19 PROBLEM — R73.02 GLUCOSE INTOLERANCE (IMPAIRED GLUCOSE TOLERANCE): Status: ACTIVE | Noted: 2017-12-15

## 2022-03-19 PROBLEM — E66.01 MORBID OBESITY (HCC): Status: ACTIVE | Noted: 2017-06-09

## 2022-03-19 PROBLEM — F43.10 PTSD (POST-TRAUMATIC STRESS DISORDER): Status: ACTIVE | Noted: 2019-05-03

## 2022-09-06 DIAGNOSIS — E28.2 PCOS (POLYCYSTIC OVARIAN SYNDROME): ICD-10-CM

## 2022-09-06 DIAGNOSIS — R73.02 GLUCOSE INTOLERANCE (IMPAIRED GLUCOSE TOLERANCE): ICD-10-CM

## 2022-09-06 DIAGNOSIS — L68.0 HIRSUTISM: ICD-10-CM

## 2022-09-06 DIAGNOSIS — E66.01 MORBID OBESITY (HCC): ICD-10-CM

## 2022-09-06 RX ORDER — SPIRONOLACTONE 100 MG/1
TABLET, FILM COATED ORAL
Qty: 90 TABLET | Refills: 3 | Status: SHIPPED | OUTPATIENT
Start: 2022-09-06

## 2023-01-12 LAB
CREATININE, EXTERNAL: 0.77
HBA1C MFR BLD HPLC: 5.1 %
LDL CHOLESTEROL, EXTERNAL: 140

## 2023-02-01 ENCOUNTER — APPOINTMENT (OUTPATIENT)
Dept: CT IMAGING | Age: 43
End: 2023-02-01
Attending: STUDENT IN AN ORGANIZED HEALTH CARE EDUCATION/TRAINING PROGRAM
Payer: COMMERCIAL

## 2023-02-01 ENCOUNTER — HOSPITAL ENCOUNTER (EMERGENCY)
Age: 43
Discharge: HOME | End: 2023-02-01
Attending: STUDENT IN AN ORGANIZED HEALTH CARE EDUCATION/TRAINING PROGRAM
Payer: COMMERCIAL

## 2023-02-01 VITALS
RESPIRATION RATE: 18 BRPM | TEMPERATURE: 97.5 F | DIASTOLIC BLOOD PRESSURE: 82 MMHG | HEART RATE: 90 BPM | OXYGEN SATURATION: 98 % | BODY MASS INDEX: 40.98 KG/M2 | SYSTOLIC BLOOD PRESSURE: 132 MMHG | WEIGHT: 255 LBS | HEIGHT: 66 IN

## 2023-02-01 DIAGNOSIS — N20.0 RIGHT KIDNEY STONE: ICD-10-CM

## 2023-02-01 DIAGNOSIS — K57.92 DIVERTICULITIS: ICD-10-CM

## 2023-02-01 DIAGNOSIS — R10.9 FLANK PAIN: Primary | ICD-10-CM

## 2023-02-01 LAB
ALBUMIN SERPL-MCNC: 4 G/DL (ref 3.5–5.2)
ALBUMIN/GLOB SERPL: 1.4 (ref 1.1–2.2)
ALP SERPL-CCNC: 47 U/L (ref 35–104)
ALT SERPL-CCNC: 12 U/L (ref 10–35)
ANION GAP SERPL CALC-SCNC: 13 MMOL/L (ref 5–15)
APPEARANCE UR: ABNORMAL
AST SERPL-CCNC: 12 U/L (ref 10–35)
BACTERIA URNS QL MICRO: NEGATIVE /HPF
BASOPHILS # BLD: 0 K/UL (ref 0–1)
BASOPHILS NFR BLD: 0 % (ref 0–1)
BILIRUB SERPL-MCNC: 0.6 MG/DL (ref 0.2–1)
BILIRUB UR QL: NEGATIVE
BUN SERPL-MCNC: 11 MG/DL (ref 6–20)
BUN/CREAT SERPL: 15 (ref 12–20)
CALCIUM SERPL-MCNC: 9.7 MG/DL (ref 8.6–10)
CHLORIDE SERPL-SCNC: 104 MMOL/L (ref 98–107)
CO2 SERPL-SCNC: 24 MMOL/L (ref 22–29)
COLOR UR: ABNORMAL
COMMENT, HOLDF: NORMAL
CREAT SERPL-MCNC: 0.73 MG/DL (ref 0.5–0.9)
DIFFERENTIAL METHOD BLD: ABNORMAL
EOSINOPHIL # BLD: 0.1 K/UL (ref 0–0.4)
EOSINOPHIL NFR BLD: 2 %
EPITH CASTS URNS QL MICRO: ABNORMAL /LPF
ERYTHROCYTE [DISTWIDTH] IN BLOOD BY AUTOMATED COUNT: 12.7 % (ref 11.5–14.5)
GLOBULIN SER CALC-MCNC: 2.9 G/DL (ref 2–4)
GLUCOSE SERPL-MCNC: 99 MG/DL (ref 65–100)
GLUCOSE UR STRIP.AUTO-MCNC: NEGATIVE MG/DL
HCG UR QL: NEGATIVE
HCT VFR BLD AUTO: 44.7 % (ref 35–47)
HGB BLD-MCNC: 14.9 G/DL (ref 11.5–16)
HGB UR QL STRIP: ABNORMAL
IMM GRANULOCYTES # BLD AUTO: 0 K/UL (ref 0–0.04)
IMM GRANULOCYTES NFR BLD AUTO: 0 % (ref 0–0.5)
KETONES UR QL STRIP.AUTO: NEGATIVE MG/DL
LEUKOCYTE ESTERASE UR QL STRIP.AUTO: NEGATIVE
LIPASE SERPL-CCNC: 34 U/L (ref 13–60)
LYMPHOCYTES # BLD: 2.4 K/UL (ref 0.8–3.5)
LYMPHOCYTES NFR BLD: 33 % (ref 12–49)
MCH RBC QN AUTO: 28.8 PG (ref 26–34)
MCHC RBC AUTO-ENTMCNC: 33.3 G/DL (ref 30–36.5)
MCV RBC AUTO: 86.3 FL (ref 80–99)
MONOCYTES # BLD: 0.5 K/UL (ref 0–1)
MONOCYTES NFR BLD: 6 % (ref 5–13)
MUCOUS THREADS URNS QL MICRO: ABNORMAL /LPF
NEUTS SEG # BLD: 4.1 K/UL (ref 1.8–8)
NEUTS SEG NFR BLD: 59 % (ref 32–75)
NITRITE UR QL STRIP.AUTO: NEGATIVE
NRBC # BLD: 0 K/UL (ref 0–0.01)
NRBC BLD-RTO: 0 PER 100 WBC
PH UR STRIP: 5.5 (ref 5–8)
PLATELET # BLD AUTO: 346 K/UL (ref 150–400)
PMV BLD AUTO: 9.2 FL (ref 8.9–12.9)
POTASSIUM SERPL-SCNC: 4.3 MMOL/L (ref 3.5–5.1)
PROT SERPL-MCNC: 6.9 G/DL (ref 6.4–8.3)
PROT UR STRIP-MCNC: NEGATIVE MG/DL
RBC # BLD AUTO: 5.18 M/UL (ref 3.8–5.2)
RBC #/AREA URNS HPF: >100 /HPF
SAMPLES BEING HELD,HOLD: NORMAL
SODIUM SERPL-SCNC: 141 MMOL/L (ref 136–145)
SP GR UR REFRACTOMETRY: 1.02 (ref 1–1.03)
UR CULT HOLD, URHOLD: NORMAL
UROBILINOGEN UR QL STRIP.AUTO: 0.2 EU/DL (ref 0.2–1)
WBC # BLD AUTO: 7.1 K/UL (ref 3.6–11)
WBC URNS QL MICRO: ABNORMAL /HPF (ref 0–4)

## 2023-02-01 PROCEDURE — 80053 COMPREHEN METABOLIC PANEL: CPT

## 2023-02-01 PROCEDURE — 74176 CT ABD & PELVIS W/O CONTRAST: CPT

## 2023-02-01 PROCEDURE — 81001 URINALYSIS AUTO W/SCOPE: CPT

## 2023-02-01 PROCEDURE — 96375 TX/PRO/DX INJ NEW DRUG ADDON: CPT

## 2023-02-01 PROCEDURE — 74011250636 HC RX REV CODE- 250/636: Performed by: STUDENT IN AN ORGANIZED HEALTH CARE EDUCATION/TRAINING PROGRAM

## 2023-02-01 PROCEDURE — 85025 COMPLETE CBC W/AUTO DIFF WBC: CPT

## 2023-02-01 PROCEDURE — 99284 EMERGENCY DEPT VISIT MOD MDM: CPT

## 2023-02-01 PROCEDURE — 83690 ASSAY OF LIPASE: CPT

## 2023-02-01 PROCEDURE — 36415 COLL VENOUS BLD VENIPUNCTURE: CPT

## 2023-02-01 PROCEDURE — 96374 THER/PROPH/DIAG INJ IV PUSH: CPT

## 2023-02-01 RX ORDER — KETOROLAC TROMETHAMINE 30 MG/ML
30 INJECTION, SOLUTION INTRAMUSCULAR; INTRAVENOUS ONCE
Status: COMPLETED | OUTPATIENT
Start: 2023-02-01 | End: 2023-02-01

## 2023-02-01 RX ORDER — ACETAMINOPHEN 500 MG
1000 TABLET ORAL
Qty: 20 TABLET | Refills: 0 | Status: SHIPPED | OUTPATIENT
Start: 2023-02-01

## 2023-02-01 RX ORDER — MOXIFLOXACIN HYDROCHLORIDE 400 MG/1
400 TABLET ORAL DAILY
Qty: 7 TABLET | Refills: 0 | Status: SHIPPED | OUTPATIENT
Start: 2023-02-01 | End: 2023-02-08

## 2023-02-01 RX ORDER — ONDANSETRON 4 MG/1
4 TABLET, ORALLY DISINTEGRATING ORAL
Qty: 12 TABLET | Refills: 0 | Status: SHIPPED | OUTPATIENT
Start: 2023-02-01

## 2023-02-01 RX ORDER — ONDANSETRON 2 MG/ML
4 INJECTION INTRAMUSCULAR; INTRAVENOUS ONCE
Status: COMPLETED | OUTPATIENT
Start: 2023-02-01 | End: 2023-02-01

## 2023-02-01 RX ORDER — IBUPROFEN 800 MG/1
800 TABLET ORAL
Qty: 20 TABLET | Refills: 0 | Status: SHIPPED | OUTPATIENT
Start: 2023-02-01 | End: 2023-02-08

## 2023-02-01 RX ORDER — OXYCODONE HYDROCHLORIDE 5 MG/1
5 TABLET ORAL
Qty: 6 TABLET | Refills: 0 | Status: SHIPPED | OUTPATIENT
Start: 2023-02-01 | End: 2023-02-04

## 2023-02-01 RX ADMIN — ONDANSETRON 4 MG: 2 INJECTION INTRAMUSCULAR; INTRAVENOUS at 07:04

## 2023-02-01 RX ADMIN — KETOROLAC TROMETHAMINE 30 MG: 30 INJECTION, SOLUTION INTRAMUSCULAR; INTRAVENOUS at 07:09

## 2023-02-01 RX ADMIN — SODIUM CHLORIDE 1000 ML: 9 INJECTION, SOLUTION INTRAVENOUS at 07:05

## 2023-02-01 NOTE — Clinical Note
[address]  [facility]PT  [address] [id number]  [phone]    Work/School Note    Date: 2/1/2023    To Whom It May concern:    Mar Bustamante was seen and treated today in the emergency room by the following provider(s):  Attending Provider: Bernabe Radford MD.      Mar Bustamante is excused from work/school on 02/01/23 and 02/02/23. She is medically clear to return to work/school on 2/3/2023.        Sincerely,          Daphnie Alberto MD

## 2023-02-01 NOTE — ED NOTES
7:00 AM  Change of shift. Care of patient taken over from Dr Garfield Shaw; H&P reviewed, bedside handoff complete. Awaiting workup for possible kidney stone. CT showing right 5mm kidney stone but patient having left flank pain. This stone will pass and is already at UVJ. Possible early diverticulitis as explanation of left sided pain. Will prescribe avelox as monotherapy due to pen allergy. Pain control and urology follow up with other supportive care measures. Follow up with PCP for monitoring of diverticulitis.

## 2023-02-01 NOTE — ED TRIAGE NOTES
Pt complains of left flank pain that woke her out of her sleep approx 1 hour PTA. Pt states she has a hx of kidney stones. Pt denies any dysuria. Pt also complains of intermittent pelvic pressure.

## 2023-02-01 NOTE — ED PROVIDER NOTES
HPI     Date of Service:  2/1/2023    Patient:  Villa Feldman    Chief Complaint:  Flank Pain       HPI:  Villa Feldman is a 36 y.o.  female with a past medical history of PCOS, remote history of kidney stones who presents for evaluation of flank pain. Patient notes this morning she woke up with left-sided flank pain which radiates to her left abdomen and groin. States it is waxing and waning and when its at its worst it is sharp and severe. States she has had some urinary pressure for the last several days but no dysuria, frequency or hematuria. Endorses associated nausea but no vomiting. No diarrhea. No other recent illness. Endorses similar symptoms in the past with kidney stones, last episode approximately 5 years ago.       Past Medical History:   Diagnosis Date    Hirsutism 3/13/2015    Migraine     Obesity 3/13/2015    PCOS (polycystic ovarian syndrome)     Psychiatric disorder     depression, anxiety       Past Surgical History:   Procedure Laterality Date    HX CRANIOTOMY Left     2017    HX OTHER SURGICAL      DNC    HX OTHER SURGICAL  04/18/2017    brain surgery for ceffleak    HX TONSILLECTOMY           Family History:   Problem Relation Age of Onset    Other Sister         PCOS       Social History     Socioeconomic History    Marital status: LEGALLY      Spouse name: Not on file    Number of children: Not on file    Years of education: Not on file    Highest education level: Not on file   Occupational History    Not on file   Tobacco Use    Smoking status: Never    Smokeless tobacco: Never   Substance and Sexual Activity    Alcohol use: No    Drug use: No    Sexual activity: Not on file   Other Topics Concern     Service Not Asked    Blood Transfusions Not Asked    Caffeine Concern Not Asked    Occupational Exposure Not Asked    Hobby Hazards Not Asked    Sleep Concern Not Asked    Stress Concern Not Asked    Weight Concern Not Asked    Special Diet Not Asked    Back Care Not Asked    Exercise Not Asked    Bike Helmet Not Asked    Seat Belt Not Asked    Self-Exams Not Asked   Social History Narrative    Not on file     Social Determinants of Health     Financial Resource Strain: Not on file   Food Insecurity: Not on file   Transportation Needs: Not on file   Physical Activity: Not on file   Stress: Not on file   Social Connections: Not on file   Intimate Partner Violence: Not on file   Housing Stability: Not on file         ALLERGIES: Penicillins    Review of Systems   Constitutional:  Negative for chills and fever. HENT:  Negative for congestion and rhinorrhea. Eyes:  Negative for discharge and redness. Respiratory:  Negative for shortness of breath. Gastrointestinal:  Positive for abdominal pain and nausea. Negative for diarrhea and vomiting. Genitourinary:  Positive for flank pain. Negative for dysuria and hematuria. Neurological:  Negative for speech difficulty. Psychiatric/Behavioral:  Negative for agitation and confusion. Vitals:    02/01/23 0631   BP: 132/82   Pulse: 90   Resp: 18   Temp: 97.5 °F (36.4 °C)   SpO2: 98%   Weight: 115.7 kg (255 lb)   Height: 5' 6\" (1.676 m)            Physical Exam  Vitals and nursing note reviewed. Constitutional:       General: She is not in acute distress. Appearance: Normal appearance. She is obese. She is not ill-appearing or toxic-appearing. HENT:      Head: Normocephalic and atraumatic. Eyes:      General: No scleral icterus. Conjunctiva/sclera: Conjunctivae normal.   Cardiovascular:      Rate and Rhythm: Normal rate. Pulses: Normal pulses. Pulmonary:      Effort: Pulmonary effort is normal. No respiratory distress. Abdominal:      Palpations: Abdomen is soft. Tenderness: There is no abdominal tenderness. There is no right CVA tenderness, left CVA tenderness, guarding or rebound. Musculoskeletal:         General: Normal range of motion. Skin:     General: Skin is warm and dry. Capillary Refill: Capillary refill takes less than 2 seconds. Neurological:      General: No focal deficit present. Mental Status: She is alert and oriented to person, place, and time. Psychiatric:         Mood and Affect: Mood normal.         Behavior: Behavior normal.        Medical Decision Making      DECISION MAKING:  Nanette Magallanes is a 36 y.o. female who comes in as above. Patient is afebrile and vital signs are stable. On my examination she is well-appearing, nontoxic. Abdomen is soft and nontender without any guarding or rebound. There is no CVA tenderness to palpation. Differential diagnosis includes, but not limited to, ureterolithiasis, pancreatitis, pyelonephritis, diverticulitis. Patient will be evaluated with laboratory work, urinalysis and CT imaging of the abdomen and pelvis for further assessment. Zofran and Toradol ordered for symptomatic relief of nausea and pain. 7:00 AM  Change of shift. Care of patient signed over to Dr. Chuck Diehl pending completion of labs and CT of the abdomen/pelvis. Amount and/or Complexity of Data Reviewed  Labs: ordered. Radiology: ordered. Risk  OTC drugs. Prescription drug management.            Procedures      Ursula Gimenez DO

## 2023-02-07 ENCOUNTER — HOSPITAL ENCOUNTER (EMERGENCY)
Age: 43
Discharge: HOME | End: 2023-02-07
Attending: STUDENT IN AN ORGANIZED HEALTH CARE EDUCATION/TRAINING PROGRAM
Payer: COMMERCIAL

## 2023-02-07 ENCOUNTER — APPOINTMENT (OUTPATIENT)
Dept: CT IMAGING | Age: 43
End: 2023-02-07
Attending: STUDENT IN AN ORGANIZED HEALTH CARE EDUCATION/TRAINING PROGRAM
Payer: COMMERCIAL

## 2023-02-07 VITALS
HEART RATE: 85 BPM | BODY MASS INDEX: 40.98 KG/M2 | DIASTOLIC BLOOD PRESSURE: 58 MMHG | TEMPERATURE: 97.6 F | HEIGHT: 66 IN | SYSTOLIC BLOOD PRESSURE: 98 MMHG | WEIGHT: 255 LBS | RESPIRATION RATE: 18 BRPM | OXYGEN SATURATION: 97 %

## 2023-02-07 DIAGNOSIS — N13.2 URETERAL STONE WITH HYDRONEPHROSIS: Primary | ICD-10-CM

## 2023-02-07 LAB
ALBUMIN SERPL-MCNC: 4.2 G/DL (ref 3.5–5.2)
ALBUMIN/GLOB SERPL: 1.6 (ref 1.1–2.2)
ALP SERPL-CCNC: 47 U/L (ref 35–104)
ALT SERPL-CCNC: 9 U/L (ref 10–35)
ANION GAP SERPL CALC-SCNC: 9 MMOL/L (ref 5–15)
APPEARANCE UR: ABNORMAL
AST SERPL-CCNC: 9 U/L (ref 10–35)
BACTERIA URNS QL MICRO: NEGATIVE /HPF
BASOPHILS # BLD: 0.1 K/UL (ref 0–1)
BASOPHILS NFR BLD: 0 % (ref 0–1)
BILIRUB SERPL-MCNC: 0.6 MG/DL (ref 0.2–1)
BILIRUB UR QL CFM: NEGATIVE
BUN SERPL-MCNC: 11 MG/DL (ref 6–20)
BUN/CREAT SERPL: 14 (ref 12–20)
CALCIUM SERPL-MCNC: 9.6 MG/DL (ref 8.6–10)
CHLORIDE SERPL-SCNC: 103 MMOL/L (ref 98–107)
CO2 SERPL-SCNC: 28 MMOL/L (ref 22–29)
COLOR UR: ABNORMAL
COMMENT, HOLDF: NORMAL
CREAT SERPL-MCNC: 0.8 MG/DL (ref 0.5–0.9)
DIFFERENTIAL METHOD BLD: ABNORMAL
EOSINOPHIL # BLD: 0.1 K/UL (ref 0–0.4)
EOSINOPHIL NFR BLD: 1 %
EPITH CASTS URNS QL MICRO: ABNORMAL /LPF
ERYTHROCYTE [DISTWIDTH] IN BLOOD BY AUTOMATED COUNT: 12.6 % (ref 11.5–14.5)
GLOBULIN SER CALC-MCNC: 2.6 G/DL (ref 2–4)
GLUCOSE SERPL-MCNC: 129 MG/DL (ref 65–100)
GLUCOSE UR STRIP.AUTO-MCNC: NEGATIVE MG/DL
HCT VFR BLD AUTO: 43.5 % (ref 35–47)
HGB BLD-MCNC: 14.1 G/DL (ref 11.5–16)
HGB UR QL STRIP: ABNORMAL
IMM GRANULOCYTES # BLD AUTO: 0 K/UL (ref 0–0.04)
IMM GRANULOCYTES NFR BLD AUTO: 0 % (ref 0–0.5)
KETONES UR QL STRIP.AUTO: NEGATIVE MG/DL
LEUKOCYTE ESTERASE UR QL STRIP.AUTO: NEGATIVE
LIPASE SERPL-CCNC: 33 U/L (ref 13–60)
LYMPHOCYTES # BLD: 3.9 K/UL (ref 0.8–3.5)
LYMPHOCYTES NFR BLD: 34 % (ref 12–49)
MCH RBC QN AUTO: 28.6 PG (ref 26–34)
MCHC RBC AUTO-ENTMCNC: 32.4 G/DL (ref 30–36.5)
MCV RBC AUTO: 88.2 FL (ref 80–99)
MONOCYTES # BLD: 0.7 K/UL (ref 0–1)
MONOCYTES NFR BLD: 6 % (ref 5–13)
MUCOUS THREADS URNS QL MICRO: ABNORMAL /LPF
NEUTS SEG # BLD: 6.7 K/UL (ref 1.8–8)
NEUTS SEG NFR BLD: 59 % (ref 32–75)
NITRITE UR QL STRIP.AUTO: NEGATIVE
NRBC # BLD: 0 K/UL (ref 0–0.01)
NRBC BLD-RTO: 0 PER 100 WBC
PH UR STRIP: 6 (ref 5–8)
PLATELET # BLD AUTO: 389 K/UL (ref 150–400)
PMV BLD AUTO: 9.3 FL (ref 8.9–12.9)
POTASSIUM SERPL-SCNC: 4.4 MMOL/L (ref 3.5–5.1)
PROT SERPL-MCNC: 6.8 G/DL (ref 6.4–8.3)
PROT UR STRIP-MCNC: ABNORMAL MG/DL
RBC # BLD AUTO: 4.93 M/UL (ref 3.8–5.2)
RBC #/AREA URNS HPF: >100 /HPF
SAMPLES BEING HELD,HOLD: NORMAL
SODIUM SERPL-SCNC: 140 MMOL/L (ref 136–145)
SP GR UR REFRACTOMETRY: 1.02 (ref 1–1.03)
UROBILINOGEN UR QL STRIP.AUTO: 0.2 EU/DL (ref 0.2–1)
WBC # BLD AUTO: 11.6 K/UL (ref 3.6–11)
WBC URNS QL MICRO: ABNORMAL /HPF (ref 0–4)

## 2023-02-07 PROCEDURE — 81001 URINALYSIS AUTO W/SCOPE: CPT

## 2023-02-07 PROCEDURE — 85025 COMPLETE CBC W/AUTO DIFF WBC: CPT

## 2023-02-07 PROCEDURE — 83690 ASSAY OF LIPASE: CPT

## 2023-02-07 PROCEDURE — 96374 THER/PROPH/DIAG INJ IV PUSH: CPT

## 2023-02-07 PROCEDURE — 99284 EMERGENCY DEPT VISIT MOD MDM: CPT

## 2023-02-07 PROCEDURE — 74176 CT ABD & PELVIS W/O CONTRAST: CPT

## 2023-02-07 PROCEDURE — 74011250636 HC RX REV CODE- 250/636: Performed by: STUDENT IN AN ORGANIZED HEALTH CARE EDUCATION/TRAINING PROGRAM

## 2023-02-07 PROCEDURE — 96375 TX/PRO/DX INJ NEW DRUG ADDON: CPT

## 2023-02-07 PROCEDURE — 96361 HYDRATE IV INFUSION ADD-ON: CPT

## 2023-02-07 PROCEDURE — 80053 COMPREHEN METABOLIC PANEL: CPT

## 2023-02-07 PROCEDURE — 36415 COLL VENOUS BLD VENIPUNCTURE: CPT

## 2023-02-07 RX ORDER — TAMSULOSIN HYDROCHLORIDE 0.4 MG/1
0.4 CAPSULE ORAL DAILY
Qty: 5 CAPSULE | Refills: 0 | Status: SHIPPED | OUTPATIENT
Start: 2023-02-07 | End: 2023-02-12

## 2023-02-07 RX ORDER — KETOROLAC TROMETHAMINE 10 MG/1
10 TABLET, FILM COATED ORAL
Qty: 8 TABLET | Refills: 0 | Status: SHIPPED | OUTPATIENT
Start: 2023-02-07 | End: 2023-02-09

## 2023-02-07 RX ORDER — ONDANSETRON 2 MG/ML
4 INJECTION INTRAMUSCULAR; INTRAVENOUS
Status: COMPLETED | OUTPATIENT
Start: 2023-02-07 | End: 2023-02-07

## 2023-02-07 RX ORDER — KETOROLAC TROMETHAMINE 30 MG/ML
30 INJECTION, SOLUTION INTRAMUSCULAR; INTRAVENOUS
Status: COMPLETED | OUTPATIENT
Start: 2023-02-07 | End: 2023-02-07

## 2023-02-07 RX ORDER — SODIUM CHLORIDE 9 MG/ML
1000 INJECTION, SOLUTION INTRAVENOUS ONCE
Status: COMPLETED | OUTPATIENT
Start: 2023-02-07 | End: 2023-02-07

## 2023-02-07 RX ORDER — MORPHINE SULFATE 4 MG/ML
4 INJECTION INTRAVENOUS
Status: COMPLETED | OUTPATIENT
Start: 2023-02-07 | End: 2023-02-07

## 2023-02-07 RX ADMIN — ONDANSETRON 4 MG: 2 INJECTION INTRAMUSCULAR; INTRAVENOUS at 02:42

## 2023-02-07 RX ADMIN — MORPHINE SULFATE 4 MG: 4 INJECTION INTRAVENOUS at 01:58

## 2023-02-07 RX ADMIN — KETOROLAC TROMETHAMINE 30 MG: 30 INJECTION, SOLUTION INTRAMUSCULAR; INTRAVENOUS at 02:36

## 2023-02-07 RX ADMIN — SODIUM CHLORIDE 1000 ML: 9 INJECTION, SOLUTION INTRAVENOUS at 01:59

## 2023-02-07 NOTE — ED TRIAGE NOTES
Patient here with complaint of ongoing back pain and abdominal pain, reports hot cold symptoms, was seen here last week and was diagnosed with diverticulitis and kidney stones. Patient reports that she is taking the medications without any great relief. Patient reports nausea without vomiting or diarrhea or fevers.

## 2023-02-07 NOTE — ED NOTES
Pt given discharge instructions, patient education, prescriptions, and follow up information. Pt verbalizes understanding. All questions answered. Patient discharged to home in private vehicle, ambulatory. Pt A&Ox4, RA, pain controlled.

## 2023-02-07 NOTE — ED PROVIDER NOTES
Patient is a 78-year-old female history of PCOS, depression and anxiety noted today secondary to right flank pain. Patient reports that she was seen here last week and diagnosed with diverticulitis and a right-sided kidney stone. She had been doing pretty well until tonight when she developed severe pain to the right flank which is radiating to the right groin and abdomen. She is urinating okay without hematuria, dysuria or frequency/urgency. She has had nausea but no vomiting or diarrhea. Patient tried taking oxycodone at home without much improvement. She is not taking Flomax currently for this kidney stone.        Past Medical History:   Diagnosis Date    Hirsutism 3/13/2015    Migraine     Obesity 3/13/2015    PCOS (polycystic ovarian syndrome)     Psychiatric disorder     depression, anxiety       Past Surgical History:   Procedure Laterality Date    HX CRANIOTOMY Left     2017    HX OTHER SURGICAL      DNC    HX OTHER SURGICAL  04/18/2017    brain surgery for ceffleak    HX TONSILLECTOMY           Family History:   Problem Relation Age of Onset    Other Sister         PCOS       Social History     Socioeconomic History    Marital status:      Spouse name: Not on file    Number of children: Not on file    Years of education: Not on file    Highest education level: Not on file   Occupational History    Not on file   Tobacco Use    Smoking status: Never    Smokeless tobacco: Never   Substance and Sexual Activity    Alcohol use: No    Drug use: No    Sexual activity: Not on file   Other Topics Concern     Service Not Asked    Blood Transfusions Not Asked    Caffeine Concern Not Asked    Occupational Exposure Not Asked    Hobby Hazards Not Asked    Sleep Concern Not Asked    Stress Concern Not Asked    Weight Concern Not Asked    Special Diet Not Asked    Back Care Not Asked    Exercise Not Asked    Bike Helmet Not Asked    Seat Belt Not Asked    Self-Exams Not Asked   Social History Narrative Not on file     Social Determinants of Health     Financial Resource Strain: Not on file   Food Insecurity: Not on file   Transportation Needs: Not on file   Physical Activity: Not on file   Stress: Not on file   Social Connections: Not on file   Intimate Partner Violence: Not on file   Housing Stability: Not on file         ALLERGIES: Penicillins    Review of Systems   Constitutional:  Negative for chills and fever. HENT:  Negative for congestion and rhinorrhea. Eyes:  Negative for redness and visual disturbance. Respiratory:  Negative for cough and shortness of breath. Cardiovascular:  Negative for chest pain and leg swelling. Gastrointestinal:  Positive for abdominal pain and nausea. Negative for diarrhea and vomiting. Genitourinary:  Positive for flank pain. Negative for dysuria, frequency, hematuria and urgency. Musculoskeletal:  Negative for arthralgias, back pain, myalgias and neck pain. Skin:  Negative for rash and wound. Allergic/Immunologic: Negative for immunocompromised state. Neurological:  Negative for dizziness and headaches. Vitals:    02/07/23 0140 02/07/23 0208   BP: (!) 98/58    Pulse: 85    Resp: 18    Temp: 97.6 °F (36.4 °C)    SpO2: 97% 97%   Weight: 115.7 kg (255 lb)    Height: 5' 6\" (1.676 m)             Physical Exam  Vitals and nursing note reviewed. Constitutional:       General: She is not in acute distress. Appearance: She is well-developed. She is not diaphoretic. HENT:      Head: Normocephalic. Mouth/Throat:      Pharynx: No oropharyngeal exudate. Eyes:      General:         Right eye: No discharge. Left eye: No discharge. Pupils: Pupils are equal, round, and reactive to light. Cardiovascular:      Rate and Rhythm: Normal rate and regular rhythm. Heart sounds: Normal heart sounds. No murmur heard. No friction rub. No gallop. Pulmonary:      Effort: Pulmonary effort is normal. No respiratory distress.       Breath sounds: Normal breath sounds. No stridor. No wheezing or rales. Abdominal:      General: Bowel sounds are normal. There is no distension. Palpations: Abdomen is soft. Tenderness: There is no abdominal tenderness. There is right CVA tenderness. There is no guarding or rebound. Musculoskeletal:         General: No deformity. Normal range of motion. Cervical back: Normal range of motion and neck supple. Skin:     General: Skin is warm and dry. Capillary Refill: Capillary refill takes less than 2 seconds. Findings: No rash. Neurological:      Mental Status: She is alert and oriented to person, place, and time. Psychiatric:         Behavior: Behavior normal.        Medical Decision Making  Work-up:  Mild leukocytosis  No anemia  Renal function electrolytes acceptable  UA not consistent with UTI but does have blood consistent with stone  CT shows 4 mm right UPJ stone, still noted mild diverticulitis for which she is taking antibiotics    Morphine given with minimal relief  Toradol given with significant relief, Zofran and also IV fluids were given      Patient is a 58-year-old female presenting today with a right-sided kidney stone. Today she has a 4 mm UPJ stone and when looking at prior scan last week she had a 5 mm UVJ stone. Patient's pain was managed as above. By the time she left she was nearly pain-free. Nausea had also improved. Afebrile. No complicating features such as infection or renal insufficiency. She is to continue her antibiotics for her diverticulitis. I started her on Flomax for the kidney stone. She already has oxycodone at home for pain. I did give her a short course of Toradol as this seemed to help the most.  She was discharged home in stable condition and she will call urology today. Amount and/or Complexity of Data Reviewed  Labs: ordered. Radiology: ordered. Risk  Prescription drug management.            Procedures

## 2023-02-07 NOTE — DISCHARGE INSTRUCTIONS
[phone]) [phone].       Return if urine decreases  Return if fever  Return if vomiting more than once  Return if severe uncontrollable pain

## 2023-02-10 ENCOUNTER — HOSPITAL ENCOUNTER (EMERGENCY)
Age: 43
Discharge: HOME | End: 2023-02-10
Attending: STUDENT IN AN ORGANIZED HEALTH CARE EDUCATION/TRAINING PROGRAM
Payer: COMMERCIAL

## 2023-02-10 VITALS
BODY MASS INDEX: 40.98 KG/M2 | SYSTOLIC BLOOD PRESSURE: 136 MMHG | OXYGEN SATURATION: 100 % | RESPIRATION RATE: 17 BRPM | DIASTOLIC BLOOD PRESSURE: 82 MMHG | HEIGHT: 66 IN | HEART RATE: 96 BPM | TEMPERATURE: 97.5 F | WEIGHT: 255 LBS

## 2023-02-10 DIAGNOSIS — R19.7 DIARRHEA, UNSPECIFIED TYPE: Primary | ICD-10-CM

## 2023-02-10 DIAGNOSIS — Z91.89 AT RISK FOR CLOSTRIDIUM DIFFICILE INFECTION: ICD-10-CM

## 2023-02-10 LAB
ALBUMIN SERPL-MCNC: 4.2 G/DL (ref 3.5–5.2)
ALBUMIN/GLOB SERPL: 1.4 (ref 1.1–2.2)
ALP SERPL-CCNC: 47 U/L (ref 35–104)
ALT SERPL-CCNC: 10 U/L (ref 10–35)
ANION GAP SERPL CALC-SCNC: 11 MMOL/L (ref 5–15)
AST SERPL-CCNC: 10 U/L (ref 10–35)
BASOPHILS # BLD: 0 K/UL (ref 0–1)
BASOPHILS NFR BLD: 0 % (ref 0–1)
BILIRUB SERPL-MCNC: 0.6 MG/DL (ref 0.2–1)
BUN SERPL-MCNC: 9 MG/DL (ref 6–20)
BUN/CREAT SERPL: 10 (ref 12–20)
C DIFF GDH STL QL: NEGATIVE
C DIFF TOX A+B STL QL IA: NEGATIVE
CALCIUM SERPL-MCNC: 10 MG/DL (ref 8.6–10)
CHLORIDE SERPL-SCNC: 105 MMOL/L (ref 98–107)
CO2 SERPL-SCNC: 27 MMOL/L (ref 22–29)
CREAT SERPL-MCNC: 0.88 MG/DL (ref 0.5–0.9)
DIFFERENTIAL METHOD BLD: ABNORMAL
EOSINOPHIL # BLD: 0 K/UL (ref 0–0.4)
EOSINOPHIL NFR BLD: 0 %
ERYTHROCYTE [DISTWIDTH] IN BLOOD BY AUTOMATED COUNT: 12.9 % (ref 11.5–14.5)
GLOBULIN SER CALC-MCNC: 2.9 G/DL (ref 2–4)
GLUCOSE SERPL-MCNC: 104 MG/DL (ref 65–100)
HCT VFR BLD AUTO: 43.7 % (ref 35–47)
HGB BLD-MCNC: 14.6 G/DL (ref 11.5–16)
IMM GRANULOCYTES # BLD AUTO: 0 K/UL (ref 0–0.04)
IMM GRANULOCYTES NFR BLD AUTO: 0 % (ref 0–0.5)
INTERPRETATION: NORMAL
LIPASE SERPL-CCNC: 36 U/L (ref 13–60)
LYMPHOCYTES # BLD: 1.9 K/UL (ref 0.8–3.5)
LYMPHOCYTES NFR BLD: 14 % (ref 12–49)
MCH RBC QN AUTO: 29.5 PG (ref 26–34)
MCHC RBC AUTO-ENTMCNC: 33.4 G/DL (ref 30–36.5)
MCV RBC AUTO: 88.3 FL (ref 80–99)
MONOCYTES # BLD: 0.6 K/UL (ref 0–1)
MONOCYTES NFR BLD: 5 % (ref 5–13)
NEUTS SEG # BLD: 10.3 K/UL (ref 1.8–8)
NEUTS SEG NFR BLD: 81 % (ref 32–75)
NRBC # BLD: 0 K/UL (ref 0–0.01)
NRBC BLD-RTO: 0 PER 100 WBC
PLATELET # BLD AUTO: 351 K/UL (ref 150–400)
PMV BLD AUTO: 9.5 FL (ref 8.9–12.9)
POTASSIUM SERPL-SCNC: 4.6 MMOL/L (ref 3.5–5.1)
PROT SERPL-MCNC: 7.1 G/DL (ref 6.4–8.3)
RBC # BLD AUTO: 4.95 M/UL (ref 3.8–5.2)
SODIUM SERPL-SCNC: 143 MMOL/L (ref 136–145)
WBC # BLD AUTO: 12.9 K/UL (ref 3.6–11)

## 2023-02-10 PROCEDURE — 36415 COLL VENOUS BLD VENIPUNCTURE: CPT

## 2023-02-10 PROCEDURE — 87324 CLOSTRIDIUM AG IA: CPT

## 2023-02-10 PROCEDURE — 96374 THER/PROPH/DIAG INJ IV PUSH: CPT

## 2023-02-10 PROCEDURE — 74011250636 HC RX REV CODE- 250/636: Performed by: STUDENT IN AN ORGANIZED HEALTH CARE EDUCATION/TRAINING PROGRAM

## 2023-02-10 PROCEDURE — 83690 ASSAY OF LIPASE: CPT

## 2023-02-10 PROCEDURE — 99284 EMERGENCY DEPT VISIT MOD MDM: CPT

## 2023-02-10 PROCEDURE — 80053 COMPREHEN METABOLIC PANEL: CPT

## 2023-02-10 PROCEDURE — 85025 COMPLETE CBC W/AUTO DIFF WBC: CPT

## 2023-02-10 RX ORDER — DICYCLOMINE HYDROCHLORIDE 20 MG/1
20 TABLET ORAL
Qty: 20 TABLET | Refills: 0 | Status: SHIPPED | OUTPATIENT
Start: 2023-02-10

## 2023-02-10 RX ORDER — ONDANSETRON 4 MG/1
4 TABLET, FILM COATED ORAL
Qty: 20 TABLET | Refills: 0 | Status: SHIPPED | OUTPATIENT
Start: 2023-02-10

## 2023-02-10 RX ORDER — VANCOMYCIN HYDROCHLORIDE 125 MG/1
125 CAPSULE ORAL 4 TIMES DAILY
Qty: 40 CAPSULE | Refills: 0 | Status: SHIPPED | OUTPATIENT
Start: 2023-02-10 | End: 2023-02-20

## 2023-02-10 RX ORDER — ONDANSETRON 2 MG/ML
4 INJECTION INTRAMUSCULAR; INTRAVENOUS
Status: COMPLETED | OUTPATIENT
Start: 2023-02-10 | End: 2023-02-10

## 2023-02-10 RX ORDER — PROMETHAZINE HYDROCHLORIDE 25 MG/1
25 TABLET ORAL
Qty: 12 TABLET | Refills: 0 | Status: SHIPPED | OUTPATIENT
Start: 2023-02-10

## 2023-02-10 RX ADMIN — ONDANSETRON 4 MG: 2 INJECTION INTRAMUSCULAR; INTRAVENOUS at 09:11

## 2023-02-10 RX ADMIN — SODIUM CHLORIDE 1000 ML: 9 INJECTION, SOLUTION INTRAVENOUS at 09:10

## 2023-02-10 NOTE — ED NOTES
Pt given discharge instructions, patient education, 4 prescriptions, and follow up information. Pt verbalizes understanding. All questions answered. Pt discharged to home in private vehicle, ambulatory. Pt A&Ox4, RA, pain controlled.

## 2023-02-10 NOTE — ED PROVIDER NOTES
EMERGENCY DEPARTMENT HISTORY AND PHYSICAL EXAM      Date: 2/10/2023  Patient Name: Ebony Ko    History of Presenting Illness     HPI: Ebony Ko, 36 y.o. female with a past medical history of kidney stones, diverticulitis, anxiety, PCOS, migraines, obesity, presents to the ED with cc of multiple episodes of watery diarrhea over the last 24 hours. Patient reports she was seen in the ER recently, and subsequently diagnosed with diverticulitis as well as kidney stone. She was started on moxifloxacin. She finished this antibiotic a few days ago. Over the past 24 hours, patient began experiencing profuse watery diarrhea associated with diffuse abdominal cramping pain along with nausea. She estimates approximately \"50 episodes\" of diarrhea since symptom onset. Denies any blood in her stools. She denies any fevers or chills. Denies prior history of C. difficile. States that she talk to her urologist, who was planning to perform a procedure to break up her kidney stone, who subsequently canceled the procedure and told her to be evaluated for the symptoms first and have stool sample sent. PCP: Daphney Garcia MD    No current facility-administered medications on file prior to encounter. Current Outpatient Medications on File Prior to Encounter   Medication Sig Dispense Refill    [] ketorolac (TORADOL) 10 mg tablet Take 1 Tablet by mouth every six (6) hours as needed for Pain for up to 2 days. 8 Tablet 0    tamsulosin (Flomax) 0.4 mg capsule Take 1 Capsule by mouth daily for 5 days. 5 Capsule 0    acetaminophen (TYLENOL) 500 mg tablet Take 2 Tablets by mouth every six (6) hours as needed for Pain or Fever. 20 Tablet 0    ondansetron (ZOFRAN ODT) 4 mg disintegrating tablet Take 1 Tablet by mouth every eight (8) hours as needed for Nausea or Vomiting.  12 Tablet 0    spironolactone (ALDACTONE) 100 mg tablet TAKE 1 TABLET NIGHTLY 90 Tablet 3    vit A/vit C/biotin/zinc/copper (HAIR-SKIN-NAIL,VIT A,C-BIOTIN, PO) Take  by mouth. semaglutide (Ozempic) 0.25 mg or 0.5 mg/dose (2 mg/1.5 ml) subq pen 0.25 mg by SubCUTAneous route every seven (7) days. multivitamin with minerals (HAIR,SKIN AND NAILS PO) Take  by mouth. norethindrone-ethinyl estradiol (JUNEL FE 1/20, 28,) 1 mg-20 mcg (21)/75 mg (7) tab TAKE 1 TABLET DAILY 84 Tab 3    SUMAtriptan (IMITREX) 50 mg tablet Take 50 mg by mouth as needed. (Patient not taking: Reported on 11/22/2021)  5    PNV No12-Iron-FA-DSS-OM-3 29 mg iron-1 mg -50 mg CPKD Take  by mouth. (Patient not taking: Reported on 11/22/2021)      omega 3-DHA-EPA-fish oil 1,000 mg (120 mg-180 mg) capsule Take 1 Cap by mouth daily. (Patient not taking: Reported on 11/22/2021)      cyclobenzaprine (FLEXERIL) 10 mg tablet Take 10 mg by mouth as needed for Muscle Spasm(s). (Patient not taking: Reported on 11/22/2021)      scopolamine (TRANSDERM-SCOP) 1.5 mg 1 Patch by TransDERmal route as needed. (Patient not taking: Reported on 11/22/2021)      amitriptyline (ELAVIL) 25 mg tablet Take 50-75 mg by mouth nightly. (Patient not taking: Reported on 11/22/2021)      EPINEPHrine (EPIPEN) 0.3 mg/0.3 mL (1:1,000) injection 0.3 mg by IntraMUSCular route as needed. fluticasone (FLONASE) 50 mcg/actuation nasal spray 2 Sprays by Both Nostrils route as needed for Rhinitis.  (Patient not taking: Reported on 11/22/2021)         Past History     Past Medical History:  Past Medical History:   Diagnosis Date    Hirsutism 3/13/2015    Migraine     Obesity 3/13/2015    PCOS (polycystic ovarian syndrome)     Psychiatric disorder     depression, anxiety       Past Surgical History:  Past Surgical History:   Procedure Laterality Date    HX CRANIOTOMY Left     2017    HX OTHER SURGICAL      DNC    HX OTHER SURGICAL  04/18/2017    brain surgery for ceffleak    HX TONSILLECTOMY         Family History:  Family History   Problem Relation Age of Onset    Other Sister         PCOS       Social History:  Social History     Tobacco Use    Smoking status: Never    Smokeless tobacco: Never   Vaping Use    Vaping Use: Never used   Substance Use Topics    Alcohol use: No    Drug use: No       Allergies: Allergies   Allergen Reactions    Penicillins Other (comments)         Review of Systems   Review of Systems   All other systems reviewed and are negative. Physical Exam   Physical Exam  Vitals and nursing note reviewed. Constitutional:       General: She is not in acute distress. Appearance: She is not ill-appearing or toxic-appearing. HENT:      Head: Normocephalic and atraumatic. Nose: Nose normal.      Mouth/Throat:      Mouth: Mucous membranes are moist.   Eyes:      Extraocular Movements: Extraocular movements intact. Pupils: Pupils are equal, round, and reactive to light. Cardiovascular:      Rate and Rhythm: Normal rate and regular rhythm. Pulses: Normal pulses. Pulmonary:      Effort: Pulmonary effort is normal.      Breath sounds: No stridor. No wheezing or rhonchi. Abdominal:      General: Abdomen is flat. Bowel sounds are normal. There is no distension. Palpations: Abdomen is soft. Tenderness: There is no abdominal tenderness. There is no right CVA tenderness, left CVA tenderness, guarding or rebound. Musculoskeletal:         General: Normal range of motion. Cervical back: Normal range of motion and neck supple. Skin:     General: Skin is warm and dry. Neurological:      General: No focal deficit present. Mental Status: She is alert and oriented to person, place, and time.    Psychiatric:         Judgment: Judgment normal.       Diagnostic Study Results     Labs -     Recent Results (from the past 24 hour(s))   CBC WITH AUTOMATED DIFF    Collection Time: 02/10/23  9:13 AM   Result Value Ref Range    WBC 12.9 (H) 3.6 - 11.0 K/uL    RBC 4.95 3.80 - 5.20 M/uL    HGB 14.6 11.5 - 16.0 g/dL    HCT 43.7 35.0 - 47.0 %    MCV 88.3 80.0 - 99.0 FL    MCH 29.5 26.0 - 34.0 PG    MCHC 33.4 30.0 - 36.5 g/dL    RDW 12.9 11.5 - 14.5 %    PLATELET 703 235 - 321 K/uL    MPV 9.5 8.9 - 12.9 FL    NRBC 0.0 0  WBC    ABSOLUTE NRBC 0.00 0.00 - 0.01 K/uL    NEUTROPHILS 81 (H) 32 - 75 %    LYMPHOCYTES 14 12 - 49 %    MONOCYTES 5 5 - 13 %    EOSINOPHILS 0 (L) 7 %    BASOPHILS 0 0 - 1 %    IMMATURE GRANULOCYTES 0 0 - 0.5 %    ABS. NEUTROPHILS 10.3 (H) 1.8 - 8.0 K/UL    ABS. LYMPHOCYTES 1.9 0.8 - 3.5 K/UL    ABS. MONOCYTES 0.6 0.0 - 1.0 K/UL    ABS. EOSINOPHILS 0.0 0.0 - 0.4 K/UL    ABS. BASOPHILS 0.0 0 - 1 K/UL    ABS. IMM. GRANS. 0.0 0.00 - 0.04 K/UL    DF AUTOMATED     METABOLIC PANEL, COMPREHENSIVE    Collection Time: 02/10/23  9:13 AM   Result Value Ref Range    Sodium 143 136 - 145 mmol/L    Potassium 4.6 3.5 - 5.1 mmol/L    Chloride 105 98 - 107 mmol/L    CO2 27 22 - 29 mmol/L    Anion gap 11 5 - 15 mmol/L    Glucose 104 (H) 65 - 100 mg/dL    BUN 9 6 - 20 MG/DL    Creatinine 0.88 0.50 - 0.90 MG/DL    BUN/Creatinine ratio 10 (L) 12 - 20      eGFR >60 >60 ml/min/1.73m2    Calcium 10.0 8.6 - 10.0 MG/DL    Bilirubin, total 0.6 0.2 - 1.0 MG/DL    ALT (SGPT) 10 10 - 35 U/L    AST (SGOT) 10 10 - 35 U/L    Alk. phosphatase 47 35 - 104 U/L    Protein, total 7.1 6.4 - 8.3 g/dL    Albumin 4.2 3.5 - 5.2 g/dL    Globulin 2.9 2.0 - 4.0 g/dL    A-G Ratio 1.4 1.1 - 2.2     LIPASE    Collection Time: 02/10/23  9:13 AM   Result Value Ref Range    Lipase 36 13 - 60 U/L       Radiologic Studies -   No orders to display     CT Results  (Last 48 hours)      None          CXR Results  (Last 48 hours)      None            Medical Decision Making   IIrving MD-- am the first provider for this patient, and I am the attending of record for this patient encounter. I reviewed the vital signs, available nursing notes, past medical history, past surgical history, family history and social history. Vital Signs-Reviewed the patient's vital signs.   Patient Vitals for the past 24 hrs:   Temp Pulse Resp BP SpO2   02/10/23 0838 97.5 °F (36.4 °C) 96 17 (!) 144/69 100 %     Records Reviewed: Prior medical records and Nursing notes    Provider Notes (Medical Decision Making):   Ddx: C. difficile, viral gastroenteritis, antibiotic side effect, etc.    Plan: Labs, C. difficile panel. Will medicate patient for nausea, and administer IVF while work-up is in process. ED Course as of 02/10/23 1044   Fri Feb 10, 2023   1040 Labs remarkable for mild leukocytosis 12.9 with leftward shift. All other results within normal limits. C. difficile is pending. Patient reevaluated, continues to endorse ongoing generalized mild abdominal cramping. Nausea is improved with antiemetic use in the ER. We will plan to provide patient with prescription for oral vancomycin should C. difficile returned positive in the next few days (patient understands to check results on portal and begin antibiotics immediately should this be detected). We will also provide her with Rx for antiemetics and Bentyl. She is to follow-up with her PCP within the next week for repeat evaluation. She felt comfortable with plan. Discharged in stable condition. Return precautions discussed. [JM]      ED Course User Index  [JM] Ulus Shone, MD       ED Course:   Initial assessment performed. The patient's presenting problems have been discussed, and they are in agreement with the care plan formulated and outlined with them. I have encouraged them to ask questions as they arise throughout their visit. Linsey Lugo MD      Disposition:  DC      DISCHARGE PLAN:  1. Current Discharge Medication List        START taking these medications    Details   ondansetron hcl (Zofran) 4 mg tablet Take 1 Tablet by mouth every eight (8) hours as needed for Nausea or Vomiting. Qty: 20 Tablet, Refills: 0  Start date: 2/10/2023      promethazine (PHENERGAN) 25 mg tablet Take 1 Tablet by mouth every six (6) hours as needed for Nausea.   Qty: 12 Tablet, Refills: 0  Start date: 2/10/2023      dicyclomine (BENTYL) 20 mg tablet Take 1 Tablet by mouth every six (6) hours as needed for Abdominal Cramps, Cramping or Pain. Qty: 20 Tablet, Refills: 0  Start date: 2/10/2023      vancomycin (VANCOCIN) 125 mg capsule Take 1 Capsule by mouth four (4) times daily for 10 days. Qty: 40 Capsule, Refills: 0  Start date: 2/10/2023, End date: 2/20/2023           2. Follow-up Information       Follow up With Specialties Details Why Contact Info    Zain Zaldivar MD Family Medicine In 3 days  [address] [facility]  [phone]      [facility] EMERGENCY DEPT Emergency Medicine  If symptoms worsen [address]  [phone]          3. Return to ED if worse     Diagnosis     Clinical Impression:   1. Diarrhea, unspecified type    2. At risk for Clostridium difficile infection        Attestations:    Rei Gilmore MD    Please note that this dictation was completed with [employer], the computer voice recognition software. Quite often unanticipated grammatical, syntax, homophones, and other interpretive errors are inadvertently transcribed by the computer software. Please disregard these errors. Please excuse any errors that have escaped final proofreading. Thank you.

## 2023-02-10 NOTE — ED TRIAGE NOTES
Pt reports to ED w/ cc of loose stool x 1 day. Pt states she was here earlier this month for a kidney stone was given antibiotics.  Today pt reports loose stool all night/day and was told to go to her PCP for a stool test

## 2023-05-05 ENCOUNTER — OFFICE VISIT (OUTPATIENT)
Dept: ENDOCRINOLOGY | Age: 43
End: 2023-05-05
Payer: COMMERCIAL

## 2023-05-05 VITALS
HEIGHT: 66 IN | DIASTOLIC BLOOD PRESSURE: 54 MMHG | TEMPERATURE: 97.9 F | OXYGEN SATURATION: 97 % | WEIGHT: 259 LBS | RESPIRATION RATE: 18 BRPM | SYSTOLIC BLOOD PRESSURE: 125 MMHG | HEART RATE: 87 BPM | BODY MASS INDEX: 41.62 KG/M2

## 2023-05-05 DIAGNOSIS — R73.02 GLUCOSE INTOLERANCE (IMPAIRED GLUCOSE TOLERANCE): ICD-10-CM

## 2023-05-05 DIAGNOSIS — E28.2 PCOS (POLYCYSTIC OVARIAN SYNDROME): Primary | ICD-10-CM

## 2023-05-05 DIAGNOSIS — E66.01 MORBID OBESITY (HCC): ICD-10-CM

## 2023-05-05 DIAGNOSIS — L68.0 HIRSUTISM: ICD-10-CM

## 2023-05-05 PROCEDURE — 99214 OFFICE O/P EST MOD 30 MIN: CPT | Performed by: INTERNAL MEDICINE

## 2023-05-05 RX ORDER — SPIRONOLACTONE 100 MG/1
100 TABLET, FILM COATED ORAL
Qty: 90 TABLET | Refills: 3 | Status: SHIPPED | OUTPATIENT
Start: 2023-05-05

## 2023-05-17 RX ORDER — SPIRONOLACTONE 100 MG/1
1 TABLET, FILM COATED ORAL NIGHTLY
COMMUNITY
Start: 2023-05-05

## 2023-05-17 RX ORDER — NORETHINDRONE ACETATE AND ETHINYL ESTRADIOL 1MG-20(21)
1 KIT ORAL DAILY
COMMUNITY
Start: 2020-01-08

## 2023-05-17 RX ORDER — ACETAMINOPHEN 500 MG
1000 TABLET ORAL EVERY 6 HOURS PRN
COMMUNITY
Start: 2023-02-01

## 2023-05-17 RX ORDER — EPINEPHRINE 0.3 MG/.3ML
0.3 INJECTION SUBCUTANEOUS PRN
COMMUNITY

## 2024-04-15 ENCOUNTER — OFFICE VISIT (OUTPATIENT)
Age: 44
End: 2024-04-15
Payer: COMMERCIAL

## 2024-04-15 VITALS
TEMPERATURE: 98.8 F | HEART RATE: 89 BPM | RESPIRATION RATE: 18 BRPM | SYSTOLIC BLOOD PRESSURE: 115 MMHG | HEIGHT: 66 IN | OXYGEN SATURATION: 98 % | BODY MASS INDEX: 40.76 KG/M2 | DIASTOLIC BLOOD PRESSURE: 45 MMHG | WEIGHT: 253.6 LBS

## 2024-04-15 DIAGNOSIS — E66.01 MORBID (SEVERE) OBESITY DUE TO EXCESS CALORIES (HCC): ICD-10-CM

## 2024-04-15 DIAGNOSIS — L68.0 HIRSUTISM: ICD-10-CM

## 2024-04-15 DIAGNOSIS — E28.2 PCOS (POLYCYSTIC OVARIAN SYNDROME): Primary | ICD-10-CM

## 2024-04-15 PROCEDURE — 99214 OFFICE O/P EST MOD 30 MIN: CPT | Performed by: INTERNAL MEDICINE

## 2024-04-15 RX ORDER — TRAMADOL HYDROCHLORIDE 50 MG/1
TABLET ORAL
COMMUNITY
Start: 2024-04-10

## 2024-04-15 RX ORDER — CIPROFLOXACIN 500 MG/1
500 TABLET, FILM COATED ORAL 2 TIMES DAILY
COMMUNITY
Start: 2024-04-09 | End: 2024-04-19

## 2024-04-15 RX ORDER — SPIRONOLACTONE 100 MG/1
100 TABLET, FILM COATED ORAL NIGHTLY
Qty: 90 TABLET | Refills: 2 | Status: SHIPPED | OUTPATIENT
Start: 2024-04-15

## 2024-04-15 RX ORDER — METRONIDAZOLE 500 MG/1
500 TABLET ORAL 3 TIMES DAILY
COMMUNITY
Start: 2024-04-09 | End: 2024-04-19

## 2024-04-15 NOTE — PROGRESS NOTES
Morbid obesity   Worked up for Cushing's, congenital adrenal hyperplasia, non-classical - negative in the past.  Low-dose late-night dexamethasone suppression test as well as salivary cortisol which were both normal.      No Qsymia due to nephrolithiasis   Original weight 320 lbs    Could not tolerate Ozempic even the lowest dose   Can try phentermine, concerned about side effects        5.  Nephrolithiasis:    Followed by urology   Type of the stone unknown   Hydration, low-salt diet  Kidney stone last episode 2024         Lala Swanson MD              Patient verbalized understanding

## 2025-01-12 DIAGNOSIS — L68.0 HIRSUTISM: ICD-10-CM

## 2025-01-12 DIAGNOSIS — E28.2 PCOS (POLYCYSTIC OVARIAN SYNDROME): ICD-10-CM

## 2025-01-12 RX ORDER — SPIRONOLACTONE 100 MG/1
100 TABLET, FILM COATED ORAL NIGHTLY
Qty: 90 TABLET | Refills: 2 | Status: SHIPPED | OUTPATIENT
Start: 2025-01-12

## 2025-03-17 PROBLEM — K76.0 STEATOSIS OF LIVER: Status: ACTIVE | Noted: 2022-03-17

## 2025-03-17 PROBLEM — G43.009 MIGRAINE WITHOUT AURA, NOT REFRACTORY: Status: ACTIVE | Noted: 2022-03-17

## 2025-03-17 PROBLEM — G47.33 OBSTRUCTIVE SLEEP APNEA: Status: ACTIVE | Noted: 2017-04-11

## 2025-03-17 PROBLEM — E78.00 HYPERCHOLESTEROLEMIA: Status: ACTIVE | Noted: 2022-03-17

## 2025-03-17 PROBLEM — R42 VERTIGO: Status: ACTIVE | Noted: 2017-03-06

## 2025-03-17 PROBLEM — G96.01: Status: ACTIVE | Noted: 2017-03-06

## 2025-03-17 PROBLEM — J30.9 ALLERGIC RHINITIS: Status: ACTIVE | Noted: 2022-03-17

## 2025-03-17 PROBLEM — F32.A DEPRESSION: Status: ACTIVE | Noted: 2017-03-06

## 2025-03-17 PROBLEM — A63.0 ANOGENITAL HUMAN PAPILLOMA VIRUS (HPV) INFECTION: Status: ACTIVE | Noted: 2021-09-01

## 2025-03-17 PROBLEM — N20.0 KIDNEY STONE: Status: ACTIVE | Noted: 2022-03-17

## 2025-08-01 ENCOUNTER — OFFICE VISIT (OUTPATIENT)
Age: 45
End: 2025-08-01
Payer: COMMERCIAL

## 2025-08-01 VITALS
TEMPERATURE: 97.8 F | HEIGHT: 66 IN | BODY MASS INDEX: 41.35 KG/M2 | WEIGHT: 257.3 LBS | DIASTOLIC BLOOD PRESSURE: 58 MMHG | OXYGEN SATURATION: 98 % | SYSTOLIC BLOOD PRESSURE: 130 MMHG | HEART RATE: 96 BPM

## 2025-08-01 DIAGNOSIS — L68.0 HIRSUTISM: ICD-10-CM

## 2025-08-01 DIAGNOSIS — E28.2 PCOS (POLYCYSTIC OVARIAN SYNDROME): Primary | ICD-10-CM

## 2025-08-01 PROCEDURE — 99214 OFFICE O/P EST MOD 30 MIN: CPT | Performed by: INTERNAL MEDICINE

## 2025-08-01 RX ORDER — SEMAGLUTIDE 0.5 MG/.5ML
0.5 INJECTION, SOLUTION SUBCUTANEOUS
COMMUNITY
Start: 2025-07-11

## 2025-08-01 RX ORDER — SPIRONOLACTONE 100 MG/1
100 TABLET, FILM COATED ORAL NIGHTLY
Qty: 90 TABLET | Refills: 3 | Status: SHIPPED | OUTPATIENT
Start: 2025-08-01

## 2025-08-01 NOTE — PROGRESS NOTES
Yazmin Robins is a 44 y.o. female here for   Chief Complaint   Patient presents with    Polycystic Ovarian Syndrome    Hirsutism       1. Have you been to the ER, urgent care clinic since your last visit?  Hospitalized since your last visit? - [facility] for headache 01/22/25    2. Have you seen or consulted any other health care providers outside of the [facility] since your last visit?  Include any pap smears or colon screening.- Otolaryngology,

## 2025-08-01 NOTE — PROGRESS NOTES
[facility] DIABETES AND ENDOCRINOLOGY                 Lala Swanson MD                      Patient Information   Date:5/8/2023   Name : Yazmin Robins 42 y.o.      YOB: 1980           Referred by: Self referred             History of Present Illness: Yazmin Robins is here for follow-up     History of Present Illness    Weight Reduction  - Initiated weight loss regimen with Zepbound three months ago, with insurance coverage for the initial two months.  - Transitioned to Wegovy due to insurance constraints, commencing at a dosage of 0.5 mg.  - Weight decreased from 269 to 251.8 pounds, averaging a reduction of 5 to 7 pounds per month.  - Reports significant appetite suppression and mild nausea on the first day of injection.    Polycystic Ovary Syndrome (PCOS)  - Has been on spironolactone since age 44, effectively managing her hirsutism.  - Reports no excessive hair growth   - Amenorrheic since December 2024, following cessation of oral contraceptive pills.  - Gynecologist advised resuming oral contraceptives, but she declined.  - Perimenopausal and opts not to use an intrauterine device (IUD).  - Rarely experienced menstrual cycles prior to initiating oral contraceptives and did not achieve regular cycles even after a weight loss of 100 pounds in 2021.  - Reports no withdrawal bleeding, spotting, or galactorrhea.  - Not on oral contraceptives as her  has undergone a vasectomy.  - Performs a pregnancy test every 2 to 3 weeks.  - Intolerant to metformin.    Nephrolithiasis  - History of bilateral nephrolithiasis and diverticulitis, requiring pharmacological and surgical management.  - Has not passed any kidney stones since 2023 and underwent lithotripsy.        FAMILY HISTORY  - No family history of medullary thyroid cancer      : Vasectomy    Prior hx    Lost 100 lbs in 2021 with low-carb diet and exercise  Was on supplement inositol for PCOS per GYN